# Patient Record
Sex: MALE | Race: WHITE | NOT HISPANIC OR LATINO | Employment: FULL TIME | URBAN - METROPOLITAN AREA
[De-identification: names, ages, dates, MRNs, and addresses within clinical notes are randomized per-mention and may not be internally consistent; named-entity substitution may affect disease eponyms.]

---

## 2017-02-16 DIAGNOSIS — Z00.00 ROUTINE GENERAL MEDICAL EXAMINATION AT A HEALTH CARE FACILITY: Primary | ICD-10-CM

## 2017-03-02 ENCOUNTER — CLINICAL SUPPORT (OUTPATIENT)
Dept: INTERNAL MEDICINE | Facility: CLINIC | Age: 52
End: 2017-03-02

## 2017-03-02 ENCOUNTER — HOSPITAL ENCOUNTER (OUTPATIENT)
Dept: CARDIOLOGY | Facility: CLINIC | Age: 52
Discharge: HOME | End: 2017-03-02
Payer: COMMERCIAL

## 2017-03-02 ENCOUNTER — HOSPITAL ENCOUNTER (OUTPATIENT)
Dept: RADIOLOGY | Facility: HOSPITAL | Age: 52
Discharge: HOME | End: 2017-03-02
Attending: INTERNAL MEDICINE
Payer: COMMERCIAL

## 2017-03-02 ENCOUNTER — OFFICE VISIT (OUTPATIENT)
Dept: PULMONOLOGY | Facility: CLINIC | Age: 52
End: 2017-03-02
Payer: COMMERCIAL

## 2017-03-02 ENCOUNTER — CLINICAL SUPPORT (OUTPATIENT)
Dept: INTERNAL MEDICINE | Facility: CLINIC | Age: 52
End: 2017-03-02
Payer: COMMERCIAL

## 2017-03-02 VITALS
WEIGHT: 243 LBS | BODY MASS INDEX: 34.79 KG/M2 | DIASTOLIC BLOOD PRESSURE: 98 MMHG | HEART RATE: 65 BPM | HEIGHT: 70 IN | SYSTOLIC BLOOD PRESSURE: 144 MMHG

## 2017-03-02 DIAGNOSIS — Z00.00 ANNUAL PHYSICAL EXAM: Primary | ICD-10-CM

## 2017-03-02 DIAGNOSIS — Z00.00 ROUTINE GENERAL MEDICAL EXAMINATION AT A HEALTH CARE FACILITY: Primary | ICD-10-CM

## 2017-03-02 DIAGNOSIS — Z00.00 ROUTINE GENERAL MEDICAL EXAMINATION AT A HEALTH CARE FACILITY: ICD-10-CM

## 2017-03-02 LAB
ALBUMIN SERPL BCP-MCNC: 4 G/DL
ALP SERPL-CCNC: 95 U/L
ALT SERPL W/O P-5'-P-CCNC: 53 U/L
ANION GAP SERPL CALC-SCNC: 7 MMOL/L
AST SERPL-CCNC: 29 U/L
BILIRUB SERPL-MCNC: 0.8 MG/DL
BUN SERPL-MCNC: 18 MG/DL
CALCIUM SERPL-MCNC: 9.7 MG/DL
CHLORIDE SERPL-SCNC: 106 MMOL/L
CHOLEST/HDLC SERPL: 5 {RATIO}
CO2 SERPL-SCNC: 27 MMOL/L
COMPLEXED PSA SERPL-MCNC: 0.38 NG/ML
CREAT SERPL-MCNC: 1.1 MG/DL
DIASTOLIC DYSFUNCTION: NO
ERYTHROCYTE [DISTWIDTH] IN BLOOD BY AUTOMATED COUNT: 13 %
EST. GFR  (AFRICAN AMERICAN): >60 ML/MIN/1.73 M^2
EST. GFR  (NON AFRICAN AMERICAN): >60 ML/MIN/1.73 M^2
ESTIMATED AVG GLUCOSE: 111 MG/DL
GLUCOSE SERPL-MCNC: 102 MG/DL
HBA1C MFR BLD HPLC: 5.5 %
HCT VFR BLD AUTO: 43.6 %
HCV AB SERPL QL IA: NEGATIVE
HDL/CHOLESTEROL RATIO: 20 %
HDLC SERPL-MCNC: 230 MG/DL
HDLC SERPL-MCNC: 46 MG/DL
HGB BLD-MCNC: 15.8 G/DL
HIV 1+2 AB+HIV1 P24 AG SERPL QL IA: NEGATIVE
LDLC SERPL CALC-MCNC: 164.2 MG/DL
MCH RBC QN AUTO: 31.3 PG
MCHC RBC AUTO-ENTMCNC: 36.2 %
MCV RBC AUTO: 87 FL
NONHDLC SERPL-MCNC: 184 MG/DL
PLATELET # BLD AUTO: 181 K/UL
PMV BLD AUTO: 10.1 FL
POTASSIUM SERPL-SCNC: 4 MMOL/L
PROT SERPL-MCNC: 7.6 G/DL
RBC # BLD AUTO: 5.04 M/UL
SODIUM SERPL-SCNC: 140 MMOL/L
TRIGL SERPL-MCNC: 99 MG/DL
TSH SERPL DL<=0.005 MIU/L-ACNC: 2.22 UIU/ML
WBC # BLD AUTO: 5.63 K/UL

## 2017-03-02 PROCEDURE — 83036 HEMOGLOBIN GLYCOSYLATED A1C: CPT

## 2017-03-02 PROCEDURE — 86803 HEPATITIS C AB TEST: CPT

## 2017-03-02 PROCEDURE — 80053 COMPREHEN METABOLIC PANEL: CPT

## 2017-03-02 PROCEDURE — 80061 LIPID PANEL: CPT

## 2017-03-02 PROCEDURE — 84153 ASSAY OF PSA TOTAL: CPT

## 2017-03-02 PROCEDURE — 93015 CV STRESS TEST SUPVJ I&R: CPT | Mod: S$GLB,,, | Performed by: INTERNAL MEDICINE

## 2017-03-02 PROCEDURE — 84443 ASSAY THYROID STIM HORMONE: CPT

## 2017-03-02 PROCEDURE — 97750 PHYSICAL PERFORMANCE TEST: CPT | Mod: S$GLB,,, | Performed by: INTERNAL MEDICINE

## 2017-03-02 PROCEDURE — 99999 PR PBB SHADOW E&M-EST. PATIENT-LVL III: CPT | Mod: PBBFAC,,, | Performed by: INTERNAL MEDICINE

## 2017-03-02 PROCEDURE — 71020 XR CHEST PA AND LATERAL: CPT | Mod: 26,,, | Performed by: RADIOLOGY

## 2017-03-02 PROCEDURE — 85027 COMPLETE CBC AUTOMATED: CPT

## 2017-03-02 PROCEDURE — 86703 HIV-1/HIV-2 1 RESULT ANTBDY: CPT

## 2017-03-02 PROCEDURE — 97802 MEDICAL NUTRITION INDIV IN: CPT | Mod: S$GLB,,, | Performed by: INTERNAL MEDICINE

## 2017-03-02 PROCEDURE — 99386 PREV VISIT NEW AGE 40-64: CPT | Mod: S$GLB,,, | Performed by: INTERNAL MEDICINE

## 2017-03-02 PROCEDURE — 71020 XR CHEST PA AND LATERAL: CPT | Mod: TC

## 2017-03-02 NOTE — PROGRESS NOTES
Subjective:       Patient ID: Michael Lilly is a 51 y.o. male.    Chief Complaint: No chief complaint on file.    HPI   Mr. Lilly has reported no previous history of cardiovascular or pulmonary disease. He has reported no physical limitations to exercise. Mr. Lilly currently stretches daily and runs/walks 5-6 days per week. He explained that he would like to lose about 40 pounds. Mr. Lilly moved to [place] about a year ago and says his biggest challenge has been the food.     Review of Systems    Objective:      The fitness evaluation results are as follows:  D.O.S. 3/2/2017   Height (in): 70   Weight (lbs): 243   BMI: 34.40563   Body Fat (%): 36.00   Waist (cm): 109   Hip (cm): 113   WHR: 0.96   RBP (mmHg): 138/92   RHR (bpm): 78    Strength R (lbs)t: 127    Strength Lt (lbs): 125.3333   Push-up Assessment: 35   Curl-up Assessment: 56   Flexibility Testing (cm): 38   REE (kcals): 2400     Physical Exam    Assessment:      Age/Gender Stratified Assessment:     Heart Rate: Normal   Resting BP: Normal   Body Fat %: Poor   WHR Risk Factor: Moderate Risk    Strength R: Average    Strength L: Above Average   Upper Body Endurance: Excellent   Abdominal Endurance: Above Average   Lower body Flexibility: Excellent       1. Routine general medical examination at a health care facility        Plan:    Mr. Lilly should continue to strive for 150 minutes of moderate intensity aerobic exercise each week in order to promote good heart health. He should also begin a resistance training program to maintain muscular strength and endurance. He is currently very active at home and is constantly doing house work on the weekends. Daily stretching should be continued to maintain current level of flexibility. Mr. Lilly should focus on maintaining his current level of fitness and decrease his body fat percentage over the next year.

## 2017-03-02 NOTE — LETTER
March 2, 2017    Michael Lilly  [address]  [place] [address]             [facility] - Pulmonary Services  [address]  [facility] [address]  Phone: [phone] Dear Mr. Lilly:    Thank you for allowing me to serve you and perform your Executive Health exam on 3/2/2017. This letter will serve as a brief summary of the physical findings and laboratory/studies performed and recommendations at this time. At today's assessment you had several lab value abnormalities, lipids and SGPT a  Liver enzyme. These will correct with weight loss. After losing 15-20 pounds over the next 2 1/2 months contact me so I can repeat these parameters.         If you have any questions or concerns, please don't hesitate to call.    Sincerely,        Ralph Landa MD

## 2017-03-02 NOTE — PROGRESS NOTES
Subjective:       Patient ID: Michael Lilly is a 51 y.o. male.    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 He feels well, lives here family still in [place]. Has been at his new job for one year today. 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 He has no medical complaints today or limitations. He walks  10,000 steps for exercise. He is overweight at 245 pounds, weighed 190 pounds in the [employer]. Takes no prescription drugs.   Review of Systems   Constitutional: Negative.    HENT: Negative.    Eyes: Negative.    Respiratory: Negative.    Cardiovascular: Negative.    Gastrointestinal: Negative.    Genitourinary: Negative.    Musculoskeletal: Negative.    Skin: Negative.    Neurological: Negative.    Psychiatric/Behavioral: Negative.    All other systems reviewed and are negative.      Objective:      Physical Exam   Constitutional: He is oriented to person, place, and time. He appears well-developed and well-nourished.   Obese: BMI:35   HENT:   Head: Normocephalic and atraumatic.   Right Ear: External ear normal.   Left Ear: External ear normal.   Eyes: Conjunctivae and EOM are normal. Pupils are equal, round, and reactive to light.   Neck: Normal range of motion. Neck supple.   Cardiovascular: Normal rate, regular rhythm and normal heart sounds.    /92 taken by me after resting.   Pulmonary/Chest: Effort normal and breath sounds normal.   Abdominal: Soft. Bowel sounds are normal.   Musculoskeletal: Normal range of motion.   Neurological: He is alert and oriented to person, place, and time. He has normal reflexes.   Skin: Skin is warm and dry.   Psychiatric: He has a normal mood and affect. His behavior is normal. Judgment and thought content normal.       Assessment:       No diagnosis found.    Plan:         Labs; Moderate elevation of cholesterol and LDL, has slight elevation  of SGPT, these are weight related. Have strongly advised him to commit to a diet and lose 15-20 pounds, Then re check lipids and SGPT. His chest x-ray is clear and his Stress EKG is not normal but is not diagnostic of ischemia. He denies chest pain or indigestion like symptoms. His way to better health and normalizing his lab abnormalities is to lose weight and increase his exercise by 20%.

## 2017-03-02 NOTE — PROGRESS NOTES
"Nutrition Assessment  Client name:  Michael Lilly  :  1965  Age:  51 y.o.  Gender:  male    Client states:  This is his first visit to  and also his first [place] and thought it was "very unique" and he will leave town next year. He is employed by [employer] travels by plane and car for business, and [facility]. He suffers from knee pain from his earlier years of football and states that he needs more regular exercise. About one month ago per the encouragement of his wife, he began taking a supplement called, Relief Factor and it has made a HUGE difference in decreasing his pain. He wears a Fit Bit and monitors his steps of walking 10,000 steps daily. By reducing his intake of carbs and breads and increasing vegetables and walking, he has been successful in losing 7# since [place]. In  he was in the [employer] and weighed 210#. He confidently states that he has the knowledge and the discipline but DOES NOT LIKE TO EXERCISE. When asked about his motivator to exercise and "what is holding him back"? He replied, " that is a good question" and had no answer. He shares that he is most challenged with healthy eating when traveling and admits to eating some type of fried food daily. His goal by next visit is to lose wt. By reducing the frequency of fried food.  He questions the validity of Relief Factor and asks for input.    No past medical history on file.    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  Vitamins, minerals, and/or supplements:  Relief Factor (anti - inflammatory)  Food allergies or intolerances:  none     Food History (recall when traveling 2x/month)  Breakfast:  2 boiled eggs, " "banana, handful dry roasted peanuts, water  Mid-morning Snack:  none  Lunch:  Burger no fries, banana + peanuts   Mid-afternoon Snack:  none  Dinner:  Fried chicken tenders, spinach  H.S. Snack:  none  Coke zero 3x/wk  100 oz. Water daily     Exercise History:  Walking 10,000 steps, walks 2 miles in evening 6x/wk    Lab Reports   Total Cholesterol:  230    Triglycerides:  99  HDL:  46  LDL:  164.2   Glucose:  102  HbA1c:  pending  BP:  138/92     Weight History  Height:  5'10"     Weight:  243  BMI:  34.94  % Body Fat:  36    Diagnosis  RMR (Method:  Body [place]):  2400 kcal  Activity Factor:  14  ERNA:  3360 - 250 = 3110    Altered nutrition related laboratory values related to improper food choices as evidenced by Chol: 230 and LDL: 164.2.    Intervention    Goals:  1.  Lose 28#, GOAL wt: 215  2.  Continue exercise 6/wk and water intake  3.  Reduce fried foods to 3x/wk  4.  3 servings fruit daily   5.  Improved lipids    Complimented client on recent behavior changes and wt. Loss. Pleasantly resistant to developing a specific strategy to accomplish the wt. Loss and later stated this was a helpful session and learned a great deal of information. Discussed and provided handout of the heart healthy fats, differences of saturated and trans fats and sources and alternate choices. Explained the 3 categories of meat with emphasis of why lean is highly suggested.suggested. Reviewed the Fast Food, Lite Restaurant dining guides and explained the Eat Fit EDMUNDO program and to use as resources while traveling and dining in the city for lunch or dinner. Client enjoys valderrama and recommended center cut. Reviewed Relief Factor and made of pure, natural ingredients (contains fish oil) and no indication of harm.    Handouts provided:  Meal Planning Guide  Restaurant Guide  Eat Fit Shopping List  Eat Fit Edmundo  Fast Food Guide  Vitamin/Mineral Guide    Monitoring/Evaluation    Monitor the following:  Weight  BMI  % Body Fat  Caloric " intake  Labs:  Cholesterol/LDL    Follow Up Plan:  Follow up with client in 1-2 years

## 2017-10-02 ENCOUNTER — OFFICE VISIT (OUTPATIENT)
Dept: URGENT CARE | Facility: CLINIC | Age: 52
End: 2017-10-02
Payer: COMMERCIAL

## 2017-10-02 VITALS
HEART RATE: 75 BPM | TEMPERATURE: 98 F | SYSTOLIC BLOOD PRESSURE: 132 MMHG | HEIGHT: 70 IN | BODY MASS INDEX: 32.93 KG/M2 | OXYGEN SATURATION: 97 % | DIASTOLIC BLOOD PRESSURE: 83 MMHG | WEIGHT: 230 LBS | RESPIRATION RATE: 18 BRPM

## 2017-10-02 DIAGNOSIS — K57.92 ACUTE DIVERTICULITIS: Primary | ICD-10-CM

## 2017-10-02 PROCEDURE — 99213 OFFICE O/P EST LOW 20 MIN: CPT | Mod: 25,S$GLB,, | Performed by: INTERNAL MEDICINE

## 2017-10-02 PROCEDURE — 3008F BODY MASS INDEX DOCD: CPT | Mod: S$GLB,,, | Performed by: INTERNAL MEDICINE

## 2017-10-02 PROCEDURE — 96372 THER/PROPH/DIAG INJ SC/IM: CPT | Mod: S$GLB,,, | Performed by: INTERNAL MEDICINE

## 2017-10-02 RX ORDER — METRONIDAZOLE 500 MG/1
500 TABLET ORAL 3 TIMES DAILY
Qty: 30 TABLET | Refills: 0 | Status: SHIPPED | OUTPATIENT
Start: 2017-10-02 | End: 2017-10-12

## 2017-10-02 RX ORDER — CIPROFLOXACIN 500 MG/1
500 TABLET ORAL 2 TIMES DAILY
Qty: 20 TABLET | Refills: 0 | Status: SHIPPED | OUTPATIENT
Start: 2017-10-02 | End: 2017-10-12

## 2017-10-02 RX ORDER — CEFTRIAXONE 1 G/1
1 INJECTION, POWDER, FOR SOLUTION INTRAMUSCULAR; INTRAVENOUS
Status: COMPLETED | OUTPATIENT
Start: 2017-10-02 | End: 2017-10-02

## 2017-10-02 RX ADMIN — CEFTRIAXONE 1 G: 1 INJECTION, POWDER, FOR SOLUTION INTRAMUSCULAR; INTRAVENOUS at 04:10

## 2017-10-02 NOTE — PROGRESS NOTES
"Subjective:       Patient ID: Michael Lilly is a 52 y.o. male.    Vitals:  height is 5' 10" (1.778 m) and weight is 104.3 kg (230 lb). His temperature is 98.4 °F (36.9 °C). His blood pressure is 132/83 and his pulse is 75. His respiration is 18 and oxygen saturation is 97%.     Chief Complaint: Abdominal Pain    Abdominal Pain   This is a new problem. The current episode started in the past 7 days. The onset quality is gradual. The problem occurs intermittently. The problem has been gradually worsening. The pain is located in the suprapubic region and periumbilical region. The pain is at a severity of 7/10. The pain is moderate. The quality of the pain is sharp. Associated symptoms include diarrhea. Pertinent negatives include no constipation, dysuria, fever, hematochezia, melena, nausea or vomiting. The pain is aggravated by movement. The pain is relieved by being still. He has tried nothing for the symptoms. The treatment provided no relief.     Review of Systems   Constitution: Positive for malaise/fatigue. Negative for chills and fever.   Cardiovascular: Negative for chest pain.   Respiratory: Negative for shortness of breath.    Musculoskeletal: Negative for back pain.   Gastrointestinal: Positive for abdominal pain and diarrhea. Negative for constipation, hematochezia, melena, nausea and vomiting.   Genitourinary: Negative for dysuria.       Objective:      Physical Exam   Constitutional: He is oriented to person, place, and time. He appears well-developed and well-nourished.   HENT:   Head: Normocephalic and atraumatic.   Eyes: Conjunctivae and EOM are normal. Pupils are equal, round, and reactive to light.   Neck: Normal range of motion. Neck supple.   Cardiovascular: Normal rate and regular rhythm.    Pulmonary/Chest: Effort normal and breath sounds normal.   Abdominal: Bowel sounds are normal.   LLQ tenderness with mild guarding; no rebound; no masses or organomegaly   Neurological: He is alert and oriented to " person, place, and time.       Assessment:       1. Acute diverticulitis        Plan:         Acute diverticulitis  -     ciprofloxacin HCl (CIPRO) 500 MG tablet; Take 1 tablet (500 mg total) by mouth 2 (two) times daily.  Dispense: 20 tablet; Refill: 0  -     metronidazole (FLAGYL) 500 MG tablet; Take 1 tablet (500 mg total) by mouth 3 (three) times daily.  Dispense: 30 tablet; Refill: 0  -     cefTRIAXone injection 1 g; Inject 1 g into the muscle one time.

## 2018-06-18 DIAGNOSIS — Z00.00 ROUTINE GENERAL MEDICAL EXAMINATION AT A HEALTH CARE FACILITY: Primary | ICD-10-CM

## 2018-07-12 ENCOUNTER — CLINICAL SUPPORT (OUTPATIENT)
Dept: INTERNAL MEDICINE | Facility: CLINIC | Age: 53
End: 2018-07-12

## 2018-07-12 ENCOUNTER — OFFICE VISIT (OUTPATIENT)
Dept: PULMONOLOGY | Facility: CLINIC | Age: 53
End: 2018-07-12
Payer: COMMERCIAL

## 2018-07-12 ENCOUNTER — CLINICAL SUPPORT (OUTPATIENT)
Dept: INTERNAL MEDICINE | Facility: CLINIC | Age: 53
End: 2018-07-12
Payer: COMMERCIAL

## 2018-07-12 ENCOUNTER — HOSPITAL ENCOUNTER (OUTPATIENT)
Dept: CARDIOLOGY | Facility: CLINIC | Age: 53
Discharge: HOME | End: 2018-07-12
Payer: COMMERCIAL

## 2018-07-12 VITALS
WEIGHT: 236 LBS | HEIGHT: 70 IN | BODY MASS INDEX: 33.79 KG/M2 | DIASTOLIC BLOOD PRESSURE: 73 MMHG | HEART RATE: 52 BPM | SYSTOLIC BLOOD PRESSURE: 125 MMHG

## 2018-07-12 DIAGNOSIS — Z00.00 ROUTINE GENERAL MEDICAL EXAMINATION AT A HEALTH CARE FACILITY: Primary | ICD-10-CM

## 2018-07-12 DIAGNOSIS — Z00.00 ANNUAL PHYSICAL EXAM: Primary | ICD-10-CM

## 2018-07-12 DIAGNOSIS — Z00.00 ROUTINE GENERAL MEDICAL EXAMINATION AT A HEALTH CARE FACILITY: ICD-10-CM

## 2018-07-12 LAB
ALBUMIN SERPL BCP-MCNC: 4 G/DL
ALP SERPL-CCNC: 98 U/L
ALT SERPL W/O P-5'-P-CCNC: 35 U/L
ANION GAP SERPL CALC-SCNC: 8 MMOL/L
AST SERPL-CCNC: 24 U/L
BILIRUB SERPL-MCNC: 1 MG/DL
BUN SERPL-MCNC: 18 MG/DL
CALCIUM SERPL-MCNC: 9.7 MG/DL
CHLORIDE SERPL-SCNC: 105 MMOL/L
CHOLEST SERPL-MCNC: 170 MG/DL
CHOLEST/HDLC SERPL: 4.9 {RATIO}
CO2 SERPL-SCNC: 26 MMOL/L
COMPLEXED PSA SERPL-MCNC: 0.44 NG/ML
CREAT SERPL-MCNC: 1.3 MG/DL
ERYTHROCYTE [DISTWIDTH] IN BLOOD BY AUTOMATED COUNT: 13.5 %
EST. GFR  (AFRICAN AMERICAN): >60 ML/MIN/1.73 M^2
EST. GFR  (NON AFRICAN AMERICAN): >60 ML/MIN/1.73 M^2
ESTIMATED AVG GLUCOSE: 103 MG/DL
GLUCOSE SERPL-MCNC: 94 MG/DL
HBA1C MFR BLD HPLC: 5.2 %
HCT VFR BLD AUTO: 50.7 %
HDLC SERPL-MCNC: 35 MG/DL
HDLC SERPL: 20.6 %
HGB BLD-MCNC: 17.1 G/DL
LDLC SERPL CALC-MCNC: 123 MG/DL
MCH RBC QN AUTO: 30.6 PG
MCHC RBC AUTO-ENTMCNC: 33.7 G/DL
MCV RBC AUTO: 91 FL
NONHDLC SERPL-MCNC: 135 MG/DL
PLATELET # BLD AUTO: 203 K/UL
PMV BLD AUTO: 10.6 FL
POTASSIUM SERPL-SCNC: 4.6 MMOL/L
PROT SERPL-MCNC: 7.4 G/DL
RBC # BLD AUTO: 5.58 M/UL
SODIUM SERPL-SCNC: 139 MMOL/L
TRIGL SERPL-MCNC: 60 MG/DL
TSH SERPL DL<=0.005 MIU/L-ACNC: 1.87 UIU/ML
WBC # BLD AUTO: 5.21 K/UL

## 2018-07-12 PROCEDURE — 80053 COMPREHEN METABOLIC PANEL: CPT

## 2018-07-12 PROCEDURE — 97802 MEDICAL NUTRITION INDIV IN: CPT | Mod: S$GLB,,, | Performed by: INTERNAL MEDICINE

## 2018-07-12 PROCEDURE — 84443 ASSAY THYROID STIM HORMONE: CPT

## 2018-07-12 PROCEDURE — 80061 LIPID PANEL: CPT

## 2018-07-12 PROCEDURE — 99999 PR PBB SHADOW E&M-EST. PATIENT-LVL III: CPT | Mod: PBBFAC,,, | Performed by: INTERNAL MEDICINE

## 2018-07-12 PROCEDURE — 84153 ASSAY OF PSA TOTAL: CPT

## 2018-07-12 PROCEDURE — 85027 COMPLETE CBC AUTOMATED: CPT

## 2018-07-12 PROCEDURE — 99396 PREV VISIT EST AGE 40-64: CPT | Mod: S$GLB,,, | Performed by: INTERNAL MEDICINE

## 2018-07-12 PROCEDURE — 93000 ELECTROCARDIOGRAM COMPLETE: CPT | Mod: S$GLB,,, | Performed by: INTERNAL MEDICINE

## 2018-07-12 PROCEDURE — 83036 HEMOGLOBIN GLYCOSYLATED A1C: CPT

## 2018-07-12 PROCEDURE — 97750 PHYSICAL PERFORMANCE TEST: CPT | Mod: S$GLB,,, | Performed by: INTERNAL MEDICINE

## 2018-07-12 NOTE — LETTER
July 13, 2018    Michael Lilly  [address]  [place] [address]             [facility] - Pulmonary Services  [address]  [facility] [address]  Phone: [phone] Dear Mr. Lilly:    Thank you for allowing me to serve you and perform your Executive Health exam on 7/12/2018. This letter will serve as a brief summary of the physical findings and laboratory/studies performed and recommendations at this time. Today's assessment is essentially normal. Keep up the weight loss program and good luck with further growth with the company.        If you have any questions or concerns, please don't hesitate to call.    Sincerely,        Ralph Landa MD

## 2018-07-12 NOTE — PROGRESS NOTES
Subjective:       Patient ID: Michael Lilly is a 53 y.o. male.    Chief Complaint: No chief complaint on file.    HPI   Pt. Has no significant cardiovascular or pulmonary history.    Physical Limitations: None    Current exercise routine:  Pt. Runs 2-2.5 miles and walks 1.5-2 miles 5 days a week.  He incorporates upper body and core resistance exercises into his walking routine 5 days a week.  Pt. Does not have any formal lower body resistance training or flexibility routine at the current time.    Goals:  Pt. Has a goal weight of 230 lbs.  We discussed the importance of paying more attention to body fat % versus his weight since he is highly muscular.    Fun Facts:  Pt. Lives in [place], [place] for most of the time now.  He is happy to be out of [place] and feels that he lives a healthier lifestyle now.    Review of Systems    Objective:     The fitness evaluation results are as follows:  D.O.S. 7/12/2018 3/2/2017   Height (in): 70 70   Weight (lbs): 237 243   BMI: 34.46635 34.142084   Body Fat (%): 23.53 36.00   Waist (cm): 108 109   Hip (cm): 112 113   WHR: 0.96 0.96   RBP (mmHg): 116/86 138/92   RHR (bpm): 50 78    Strength R (lbs)t: 136.6667 127    Strength Lt (lbs): 125 125.58443   Push-up Assessment: 46 35   Curl-up Assessment: 41 56   Flexibility Testing (cm): 39 38   REE (kcals): 2120 2400       Physical Exam    Assessment:     Age/gender stratified assessment:  Resting BP: Within Normal Limits   Body Fat %: good   WHR Risk Factor: moderate risk    Strength R: above average    Strength L: above average   Upper Body Endurance: excellent   Abdominal Endurance: average   Lower body Flexibiltiy: excellent       1. Routine general medical examination at a health care facility        Plan:       Recommended fitness guidelines:    -150 minutes of moderate intensity aerobic exercise per week or 75 minutes of vigorous intensity aerobic exercise per week.  Try to incorporate more vigorous intensity  aerobic training into your routine.    -2 to 4 days per week of resistance training for each muscle group.  Start to incorporate more lower body resisatnce training exercises.  Practice the core stabilization program given during the evaluation.    -Daily stretching with a hold of at least 30 seconds per muscle group.

## 2018-07-12 NOTE — PROGRESS NOTES
"Nutrition Assessment  This is a general nutrition consultation as per the contractual agreement of the client employer's insurance carrier.    Client name:  Michael Lilly  :  1965  Age:  53 y.o.  Gender:  male    Client states:  In a joking manner that I can paste his note from last year as he it is much the same free of many changes in eating.  He feels the best that he has in a long time. He attributes this to use of testosterone pellets injected twice per year. Additionally he struggled with the motivation to exercise, however that improved as well. He continues to use "Relief Factor" and has added, Callotren (Collagen) and overall has less aches and pains. His work with [employer] in [place] requires car travel and he has not been in [place] except for weekends and this change is responsible for him losing 5# as he does not have the constant temptation of fried foods. He [place] in [place], [place] and as a result, his wife and son were bitten by a specific species of tick and have become allergic to red meat. His protein choices due to this incident, are more chicken and fish usually tuna. Overall he is eating less carb's, fried foods, less red meat and is exercising at a higher intensity---all improvements from last visit which are positively reflected in his lipid results. He has no specific nutritional related questions at this time.     No past medical history on file.    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  Vitamins, minerals, and/or supplements:  Callotren (Collagen), Relief Factor (joint pain)   Food allergies or intolerances:  none     Food History  Breakfast:  2- 3 boiled eggs  Mid-morning Snack:  " "none  Lunch:  Chicken sandwich  Mid-afternoon Snack:  None   Dinner:  Grilled tuna with vegetables  H.S. Snack:  None  Beverages: 5.5 bottles water, unsweetened tea, diet coke 1-2 daily     Exercise History:  Runs 2.5 miles + walking 2 miles 5xwk for duration of 1.25 hrs.    Lab Reports   Total Cholesterol:  170    Triglycerides:  60  HDL:  35  LDL:  123   Glucose:  94  HbA1c:  5.2  BP:  116/86     Weight History  Height:  5'10"     Weight:  237  BMI:  34.08  % Body Fat:  23.53    Diagnosis  RMR (Method:  Body [place]):  2120 kcal  Activity Factor:  1.4  ENRA:  2968    No nutrition related diagnosis at this time as client verbalizes he declines new goals for the next year.    Intervention    Goals:  1.  Continue with healthy eating choices  2.  Continue with exercise routine    Reviewed labs with client and all lipids improved with the exception of HDL. Complimented client on these positive results, motivating himself to exercise and implementing good choices and behaviors while traveling and no prescribed medications. Client declines new goals at this time and wishes to continue what he is currently doing. He appears to be surprised by his improved lipid results, as it was explained that all of his improved choices are a contribution to these readings.    Handouts provided:  Meal Planning Guide  Restaurant Guide  Eat Fit Shopping List  Eat Fit Arti  Fast Food Guide  Vitamin/Mineral Guide    Monitoring/Evaluation    Monitor the following:  Weight  BMI  % Body Fat  Caloric intake  Labs:  CMP/Lipids    Follow Up Plan:  Follow up with client in 1-2 years  "

## 2018-07-13 NOTE — PROGRESS NOTES
Subjective:       Patient ID: Michael Lilly is a 53 y.o. male.    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 He feels well, had one bout of acute diverticulitis since last visit that responded to cipro and flagyl. Feels fine today. Takes no prescription drugs. His wife and family still live in [place] and goes home of the week end. No medical complaints today. Has lost 7 pounds and is working on losing more.  Review of Systems   Constitutional: Negative.    HENT: Negative.    Eyes: Negative.    Respiratory: Negative.    Cardiovascular: Negative.    Gastrointestinal: Negative.         One bout of acute diverticulitis   Genitourinary: Negative.    Musculoskeletal: Negative.    Skin: Negative.    Neurological: Negative.    Psychiatric/Behavioral: Negative.    All other systems reviewed and are negative.      Objective:      Physical Exam   Constitutional: He is oriented to person, place, and time. He appears well-developed and well-nourished.   HENT:   Head: Normocephalic and atraumatic.   Right Ear: External ear normal.   Left Ear: External ear normal.   Eyes: Conjunctivae and EOM are normal. Pupils are equal, round, and reactive to light.   Neck: Normal range of motion. Neck supple.   Cardiovascular: Normal rate, regular rhythm and normal heart sounds.    Pulmonary/Chest: Effort normal and breath sounds normal.   Abdominal: Soft. Bowel sounds are normal.   Negative. No LLL findings.   Musculoskeletal: Normal range of motion.   Neurological: He is alert and oriented to person, place, and time. He has normal reflexes.   Skin: Skin is warm and dry.   Psychiatric: He has a normal mood and affect. His behavior is normal. Judgment and thought content normal.       Assessment:       No diagnosis found.    Plan:       Labs:All parameters are normal. Lipids  Have improved dramatically. EKG is normal.  IMP: Healthy Male

## 2019-06-07 DIAGNOSIS — Z00.00 ROUTINE GENERAL MEDICAL EXAMINATION AT A HEALTH CARE FACILITY: Primary | ICD-10-CM

## 2019-07-11 ENCOUNTER — CLINICAL SUPPORT (OUTPATIENT)
Dept: INTERNAL MEDICINE | Facility: CLINIC | Age: 54
End: 2019-07-11
Payer: COMMERCIAL

## 2019-07-11 ENCOUNTER — HOSPITAL ENCOUNTER (OUTPATIENT)
Dept: CARDIOLOGY | Facility: CLINIC | Age: 54
Discharge: HOME | End: 2019-07-11
Payer: COMMERCIAL

## 2019-07-11 ENCOUNTER — OFFICE VISIT (OUTPATIENT)
Dept: PULMONOLOGY | Facility: CLINIC | Age: 54
End: 2019-07-11
Payer: COMMERCIAL

## 2019-07-11 VITALS
HEIGHT: 70 IN | DIASTOLIC BLOOD PRESSURE: 82 MMHG | HEART RATE: 65 BPM | BODY MASS INDEX: 34.93 KG/M2 | SYSTOLIC BLOOD PRESSURE: 133 MMHG | WEIGHT: 244 LBS | RESPIRATION RATE: 12 BRPM

## 2019-07-11 DIAGNOSIS — Z00.00 ROUTINE GENERAL MEDICAL EXAMINATION AT A HEALTH CARE FACILITY: ICD-10-CM

## 2019-07-11 DIAGNOSIS — Z00.00 ROUTINE GENERAL MEDICAL EXAMINATION AT A HEALTH CARE FACILITY: Primary | ICD-10-CM

## 2019-07-11 DIAGNOSIS — Z00.00 ANNUAL PHYSICAL EXAM: Primary | ICD-10-CM

## 2019-07-11 LAB
ALBUMIN SERPL BCP-MCNC: 3.9 G/DL (ref 3.5–5.2)
ALP SERPL-CCNC: 80 U/L (ref 55–135)
ALT SERPL W/O P-5'-P-CCNC: 38 U/L (ref 10–44)
ANION GAP SERPL CALC-SCNC: 8 MMOL/L (ref 8–16)
AST SERPL-CCNC: 25 U/L (ref 10–40)
BILIRUB SERPL-MCNC: 1 MG/DL (ref 0.1–1)
BUN SERPL-MCNC: 11 MG/DL (ref 6–20)
CALCIUM SERPL-MCNC: 9.5 MG/DL (ref 8.7–10.5)
CHLORIDE SERPL-SCNC: 105 MMOL/L (ref 95–110)
CHOLEST SERPL-MCNC: 207 MG/DL (ref 120–199)
CHOLEST/HDLC SERPL: 5.8 {RATIO} (ref 2–5)
CO2 SERPL-SCNC: 26 MMOL/L (ref 23–29)
COMPLEXED PSA SERPL-MCNC: 0.48 NG/ML (ref 0–4)
CREAT SERPL-MCNC: 1.3 MG/DL (ref 0.5–1.4)
ERYTHROCYTE [DISTWIDTH] IN BLOOD BY AUTOMATED COUNT: 13.2 % (ref 11.5–14.5)
EST. GFR  (AFRICAN AMERICAN): >60 ML/MIN/1.73 M^2
EST. GFR  (NON AFRICAN AMERICAN): >60 ML/MIN/1.73 M^2
ESTIMATED AVG GLUCOSE: 108 MG/DL (ref 68–131)
GLUCOSE SERPL-MCNC: 90 MG/DL (ref 70–110)
HBA1C MFR BLD HPLC: 5.4 % (ref 4–5.6)
HCT VFR BLD AUTO: 51.9 % (ref 40–54)
HDLC SERPL-MCNC: 36 MG/DL (ref 40–75)
HDLC SERPL: 17.4 % (ref 20–50)
HGB BLD-MCNC: 17.6 G/DL (ref 14–18)
LDLC SERPL CALC-MCNC: 152.4 MG/DL (ref 63–159)
MCH RBC QN AUTO: 31.4 PG (ref 27–31)
MCHC RBC AUTO-ENTMCNC: 33.9 G/DL (ref 32–36)
MCV RBC AUTO: 93 FL (ref 82–98)
NONHDLC SERPL-MCNC: 171 MG/DL
PLATELET # BLD AUTO: 194 K/UL (ref 150–350)
PMV BLD AUTO: 10.5 FL (ref 9.2–12.9)
POTASSIUM SERPL-SCNC: 4.8 MMOL/L (ref 3.5–5.1)
PROT SERPL-MCNC: 7.4 G/DL (ref 6–8.4)
RBC # BLD AUTO: 5.61 M/UL (ref 4.6–6.2)
SODIUM SERPL-SCNC: 139 MMOL/L (ref 136–145)
TRIGL SERPL-MCNC: 93 MG/DL (ref 30–150)
TSH SERPL DL<=0.005 MIU/L-ACNC: 2.98 UIU/ML (ref 0.4–4)
WBC # BLD AUTO: 6.18 K/UL (ref 3.9–12.7)

## 2019-07-11 PROCEDURE — 97802 MEDICAL NUTRITION INDIV IN: CPT | Mod: S$GLB,,, | Performed by: INTERNAL MEDICINE

## 2019-07-11 PROCEDURE — 97750 PR PHYSICAL PERFORMANCE TEST: ICD-10-PCS | Mod: S$GLB,,, | Performed by: INTERNAL MEDICINE

## 2019-07-11 PROCEDURE — 84153 ASSAY OF PSA TOTAL: CPT

## 2019-07-11 PROCEDURE — 83036 HEMOGLOBIN GLYCOSYLATED A1C: CPT

## 2019-07-11 PROCEDURE — 93000 EKG 12-LEAD: ICD-10-PCS | Mod: S$GLB,,, | Performed by: INTERNAL MEDICINE

## 2019-07-11 PROCEDURE — 97750 PHYSICAL PERFORMANCE TEST: CPT | Mod: S$GLB,,, | Performed by: INTERNAL MEDICINE

## 2019-07-11 PROCEDURE — 99396 PR PREVENTIVE VISIT,EST,40-64: ICD-10-PCS | Mod: S$PBB,,, | Performed by: INTERNAL MEDICINE

## 2019-07-11 PROCEDURE — 85027 COMPLETE CBC AUTOMATED: CPT

## 2019-07-11 PROCEDURE — 99999 PR PBB SHADOW E&M-EST. PATIENT-LVL III: CPT | Mod: PBBFAC,,, | Performed by: INTERNAL MEDICINE

## 2019-07-11 PROCEDURE — 93000 ELECTROCARDIOGRAM COMPLETE: CPT | Mod: S$GLB,,, | Performed by: INTERNAL MEDICINE

## 2019-07-11 PROCEDURE — 84443 ASSAY THYROID STIM HORMONE: CPT

## 2019-07-11 PROCEDURE — 99999 PR PBB SHADOW E&M-EST. PATIENT-LVL III: ICD-10-PCS | Mod: PBBFAC,,, | Performed by: INTERNAL MEDICINE

## 2019-07-11 PROCEDURE — 97802 PR MED NUTR THER, 1ST, INDIV, EA 15 MIN: ICD-10-PCS | Mod: S$GLB,,, | Performed by: INTERNAL MEDICINE

## 2019-07-11 PROCEDURE — 80053 COMPREHEN METABOLIC PANEL: CPT

## 2019-07-11 PROCEDURE — 80061 LIPID PANEL: CPT

## 2019-07-11 PROCEDURE — 99396 PREV VISIT EST AGE 40-64: CPT | Mod: S$PBB,,, | Performed by: INTERNAL MEDICINE

## 2019-07-11 NOTE — PROGRESS NOTES
"Nutrition Assessment  Client name:  Michael Lilly    (Annual  physical)  :  1965  Age:  54 y.o.  Gender:  male    Client states: he continues to use "Relief Factor" and has added, Callotren (Collagen) and overall has less aches and pains. His work with [employer] in [employer] requires 70% car travel and he dines out all meals and has the constant temptation of fried foods, and frankly he says, "I was in the [employer] but have not been exercising discipline. I feel like I exercise, therefore I should be able to eat what I want". Based on this approach, he has gained 9# since last visit and adds that he is experiencing more stress with his work, and appears disappointed in himself. He resides in [place], [place]" and as a result, his wife and son were bitten by a specific species of tick and have become allergic to red meat. His protein choices due to this incident, are more chicken and fish usually tuna. In spite of road travel, he is consistent with his exercise routine and chooses to jog 4.5 miles in the morning outdoors. Client is not very engaged and only responds to questions, as he knows what to do, have more discipline. By next visit he would like to lose wt. And have improved Cholesterol and LDL levels.     Anthropometrics  Height:  5'10"     Weight:  246  BMI:  35.37  % Body Fat:  24.03    Clinical Signs/Symptoms  N/V/D:  none  Appetite (Good, Fair, or Poor):  good      No past medical history on file.    No past surgical history on file.    Medications    currently has no medications in their medication list.    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    Cultural/Spiritual/Personal Preferences:  None identified    Support System:  friends    State of Change:  Preparation    Barriers to Change:  Stress, car travel, lack of disciple     Diagnosis    Altered nutrition related laboratory values related to improper food choices and imbalanced meals as evidenced by Chol: 207, LDL: 152.4 and HDL: 36.    Intervention    RMR (Method:  Body [place]):  2330 kcal  Activity Factor:  1.4  ERNA:  3262 - 125 = 3137    Goals:  1.  Lose 9#, Goal Wt: 237#  2.  50% of meals - healthy choices, using dining guides  3.  Chol: <207, LDL: <152  4.  Continue with aerobic exercise    Nutrition Education  Reviewed and explained laboratory results and sources of saturated and trans fats and plant based fats to lower lipids and increase HDL. Mentioned to client that stress can drive lipids upward, and when the stress has lessened it may be easier for him to focus on self care and healthier selections. Reviewed the Fast Food, Lite Restaurant Dining guide and Eat fit mobile viviane with client and he responded that he will use the Lite Restaurant guide. Consult was 30 minutes in length as client did not have any questions.     Patient verbalized understanding of nutrition education and recommendations received.    Handouts Provided  Meal Planning Guide  Restaurant Guide  Eat Fit Shopping List  Eat Fit Arti  Fast Food Guide  Vitamin/Mineral Guide    Monitoring/Evaluation    Monitor the following:  Weight  BMI  % Body Fat  Caloric intake  Labs:  Lipids    Follow Up Plan:  Communication with referring healthcare provider is unnecessary at this time  as patient presented as part of annual wellness exam.  However, will follow up with patient in 1-2 years.

## 2019-07-11 NOTE — PROGRESS NOTES
Subjective:       Patient ID: Michael Lilly is a 54 y.o. male.    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 He feels well, no medical encounters since his last visit, He has a small easily reducible umbilical hernia. He lives  In [place] and commutes to [place] for work. Takes no medications but did gain back the 7 pounds that he lost. He weighs 244 but very muscular.    Review of Systems   Constitutional: Negative.    HENT: Negative.    Eyes: Negative.    Respiratory: Negative.    Cardiovascular: Negative.    Gastrointestinal: Negative.         Hx of infrequent bouts of diverticulitis  No episodes in the past year.   Genitourinary: Negative.    Musculoskeletal: Negative.    Skin: Negative.    Neurological: Negative.    Psychiatric/Behavioral: Negative.    All other systems reviewed and are negative.      Objective:      Physical Exam   Constitutional: He is oriented to person, place, and time. He appears well-developed and well-nourished.   HENT:   Head: Normocephalic and atraumatic.   Right Ear: External ear normal.   Left Ear: External ear normal.   Eyes: Pupils are equal, round, and reactive to light. Conjunctivae and EOM are normal.   Neck: Normal range of motion. Neck supple.   Cardiovascular: Normal rate, regular rhythm and normal heart sounds.   /80   Pulmonary/Chest: Effort normal and breath sounds normal.   Peak flow 600 l/min   Abdominal: Soft. Bowel sounds are normal.   Small easy reducible umbilical hernia   Musculoskeletal: Normal range of motion.   Neurological: He is alert and oriented to person, place, and time. He has normal reflexes.   Skin: Skin is warm and dry.   Psychiatric: He has a normal mood and affect. His behavior is normal. Judgment and thought content normal.       Assessment:       1. Annual physical exam        Plan:       Labs:Choolesterol: 207 up from 170 last year, all other parameters are normal.  EKG :Nomral. Imp; Mild type II hyperlipidemia

## 2019-07-11 NOTE — PROGRESS NOTES
Subjective:       Patient ID: Michael Lilly is a 54 y.o. male.    Chief Complaint: No chief complaint on file.    HPI   Pt. Has no significant cardiovascular or pulmonary history.    Physical Limitations:  None.      Current exercise routine:  Patient currently performs full-body resistance training exercise and runs/walks 3-6 miles, 4 days a week.  Patient stretches an average of 2 days a week.    Goals:  Patient set a goal weight of 230 lbs.    Fun Facts:  Patient was very friendly and engaged.  Patient has been traveling a lot over the last year between [facility], and [place].  Patient is very aware that he has gained weight over the past year and attributes this to his diet.  Patient was very receptive to all recommendations made.      Review of Systems    Objective:     The fitness evaluation results are as follows:  D.O.S. 7/11/2019 7/12/2018 3/2/2017   Height (in): 70 70 70   Weight (lbs): 246 237 243   BMI: 35.158956 34.877269 34.293148   Body Fat (%): 24.03 23.53 36.00   Waist (cm): 110 108 109   Hip (cm): 113 112 113   WHR: 0.97 0.96 0.96   RBP (mmHg): 128/86 116/86 138/92   RHR (bpm): 66 50 78    Strength R (lbs)t: 133.44439 136.14258 127    Strength Lt (lbs): 130 125 125.74633   Push-up Assessment: 50 46 35   Curl-up Assessment: 40 41 56   Flexibility Testing (cm): 36 39 38   REE (kcals): 2330 2120 2400       Physical Exam    Assessment:     Age/gender stratified assessment:  Resting BP: Within Normal Limits   Body Fat %: Good   WHR Risk Factor: Moderate Risk    Strength R: Above Average    Strength L: Above Average   Upper Body Endurance: Excellent   Abdominal Endurance: Average   Lower body Flexibiltiy: Excellent       1. Routine general medical examination at a health care facility        Plan:       Recommended fitness guidelines:    -150 minutes of moderate intensity aerobic exercise per week or 75 minutes of vigorous intensity aerobic exercise per week.  Try  to incorporate some interval training into your current routine to increase your heart rate and get more vigorous intensity exercise, for short periods of time.    -2 to 4 days per week of resistance training for each muscle group.      -Daily stretching with a hold of at least 30 seconds per muscle group.

## 2019-07-11 NOTE — LETTER
July 11, 2019    Michael Lilly  [address]  [place] [address]             [facility]  [address]  [facility] [address]  Phone: [phone] Dear Mr. Lilly:    Thank you for allowing me to serve you and perform your Executive Health exam on 7/11/2019. This letter will serve as a brief summary of the physical findings and laboratory/studies performed and recommendations at this time.  Except for a slight elevation of the total cholesterol, this is a normal exam. You can address the cholesterol issue with a diet modification.         If you have any questions or concerns, please don't hesitate to call.    Sincerely,        Ralph Landa MD

## 2020-06-02 DIAGNOSIS — Z00.00 ROUTINE GENERAL MEDICAL EXAMINATION AT A HEALTH CARE FACILITY: Primary | ICD-10-CM

## 2020-07-15 ENCOUNTER — CLINICAL SUPPORT (OUTPATIENT)
Dept: INTERNAL MEDICINE | Facility: CLINIC | Age: 55
End: 2020-07-15
Payer: COMMERCIAL

## 2020-07-15 ENCOUNTER — OFFICE VISIT (OUTPATIENT)
Dept: PULMONOLOGY | Facility: CLINIC | Age: 55
End: 2020-07-15
Payer: COMMERCIAL

## 2020-07-15 ENCOUNTER — CLINICAL SUPPORT (OUTPATIENT)
Dept: INTERNAL MEDICINE | Facility: CLINIC | Age: 55
End: 2020-07-15

## 2020-07-15 ENCOUNTER — HOSPITAL ENCOUNTER (OUTPATIENT)
Dept: CARDIOLOGY | Facility: CLINIC | Age: 55
Discharge: HOME | End: 2020-07-15
Payer: COMMERCIAL

## 2020-07-15 VITALS
SYSTOLIC BLOOD PRESSURE: 123 MMHG | DIASTOLIC BLOOD PRESSURE: 81 MMHG | WEIGHT: 236 LBS | HEIGHT: 70 IN | BODY MASS INDEX: 33.79 KG/M2 | HEART RATE: 65 BPM

## 2020-07-15 DIAGNOSIS — Z00.00 ROUTINE GENERAL MEDICAL EXAMINATION AT A HEALTH CARE FACILITY: Primary | ICD-10-CM

## 2020-07-15 DIAGNOSIS — Z00.00 ANNUAL PHYSICAL EXAM: Primary | ICD-10-CM

## 2020-07-15 DIAGNOSIS — Z00.00 ROUTINE GENERAL MEDICAL EXAMINATION AT A HEALTH CARE FACILITY: ICD-10-CM

## 2020-07-15 LAB
ALBUMIN SERPL BCP-MCNC: 4 G/DL (ref 3.5–5.2)
ALP SERPL-CCNC: 75 U/L (ref 55–135)
ALT SERPL W/O P-5'-P-CCNC: 37 U/L (ref 10–44)
ANION GAP SERPL CALC-SCNC: 6 MMOL/L (ref 8–16)
AST SERPL-CCNC: 29 U/L (ref 10–40)
BILIRUB SERPL-MCNC: 0.9 MG/DL (ref 0.1–1)
BUN SERPL-MCNC: 15 MG/DL (ref 6–20)
CALCIUM SERPL-MCNC: 9.6 MG/DL (ref 8.7–10.5)
CHLORIDE SERPL-SCNC: 106 MMOL/L (ref 95–110)
CHOLEST SERPL-MCNC: 173 MG/DL (ref 120–199)
CHOLEST/HDLC SERPL: 4.9 {RATIO} (ref 2–5)
CO2 SERPL-SCNC: 27 MMOL/L (ref 23–29)
COMPLEXED PSA SERPL-MCNC: 0.35 NG/ML (ref 0–4)
CREAT SERPL-MCNC: 1.3 MG/DL (ref 0.5–1.4)
ERYTHROCYTE [DISTWIDTH] IN BLOOD BY AUTOMATED COUNT: 13.6 % (ref 11.5–14.5)
EST. GFR  (AFRICAN AMERICAN): >60 ML/MIN/1.73 M^2
EST. GFR  (NON AFRICAN AMERICAN): >60 ML/MIN/1.73 M^2
ESTIMATED AVG GLUCOSE: 105 MG/DL (ref 68–131)
GLUCOSE SERPL-MCNC: 88 MG/DL (ref 70–110)
HBA1C MFR BLD HPLC: 5.3 % (ref 4–5.6)
HCT VFR BLD AUTO: 51.7 % (ref 40–54)
HDLC SERPL-MCNC: 35 MG/DL (ref 40–75)
HDLC SERPL: 20.2 % (ref 20–50)
HGB BLD-MCNC: 17.4 G/DL (ref 14–18)
LDLC SERPL CALC-MCNC: 126.6 MG/DL (ref 63–159)
MCH RBC QN AUTO: 30.4 PG (ref 27–31)
MCHC RBC AUTO-ENTMCNC: 33.7 G/DL (ref 32–36)
MCV RBC AUTO: 90 FL (ref 82–98)
NONHDLC SERPL-MCNC: 138 MG/DL
PLATELET # BLD AUTO: 218 K/UL (ref 150–350)
PMV BLD AUTO: 10.5 FL (ref 9.2–12.9)
POTASSIUM SERPL-SCNC: 4.5 MMOL/L (ref 3.5–5.1)
PROT SERPL-MCNC: 7.4 G/DL (ref 6–8.4)
RBC # BLD AUTO: 5.72 M/UL (ref 4.6–6.2)
SODIUM SERPL-SCNC: 139 MMOL/L (ref 136–145)
TRIGL SERPL-MCNC: 57 MG/DL (ref 30–150)
TSH SERPL DL<=0.005 MIU/L-ACNC: 1.99 UIU/ML (ref 0.4–4)
WBC # BLD AUTO: 5.38 K/UL (ref 3.9–12.7)

## 2020-07-15 PROCEDURE — 80061 LIPID PANEL: CPT

## 2020-07-15 PROCEDURE — 93005 EKG 12-LEAD: ICD-10-PCS | Mod: S$GLB,,, | Performed by: INTERNAL MEDICINE

## 2020-07-15 PROCEDURE — 3008F PR BODY MASS INDEX (BMI) DOCUMENTED: ICD-10-PCS | Mod: CPTII,S$GLB,, | Performed by: INTERNAL MEDICINE

## 2020-07-15 PROCEDURE — 99999 PR PBB SHADOW E&M-EST. PATIENT-LVL III: CPT | Mod: PBBFAC,,, | Performed by: INTERNAL MEDICINE

## 2020-07-15 PROCEDURE — 97750 PHYSICAL PERFORMANCE TEST: CPT | Mod: S$GLB,,, | Performed by: INTERNAL MEDICINE

## 2020-07-15 PROCEDURE — 99396 PREV VISIT EST AGE 40-64: CPT | Mod: S$GLB,,, | Performed by: INTERNAL MEDICINE

## 2020-07-15 PROCEDURE — 85027 COMPLETE CBC AUTOMATED: CPT

## 2020-07-15 PROCEDURE — 84153 ASSAY OF PSA TOTAL: CPT

## 2020-07-15 PROCEDURE — 99396 PR PREVENTIVE VISIT,EST,40-64: ICD-10-PCS | Mod: S$GLB,,, | Performed by: INTERNAL MEDICINE

## 2020-07-15 PROCEDURE — 83036 HEMOGLOBIN GLYCOSYLATED A1C: CPT

## 2020-07-15 PROCEDURE — 93010 ELECTROCARDIOGRAM REPORT: CPT | Mod: S$GLB,,, | Performed by: INTERNAL MEDICINE

## 2020-07-15 PROCEDURE — 84443 ASSAY THYROID STIM HORMONE: CPT

## 2020-07-15 PROCEDURE — 97802 MEDICAL NUTRITION INDIV IN: CPT | Mod: S$GLB,,, | Performed by: INTERNAL MEDICINE

## 2020-07-15 PROCEDURE — 3008F BODY MASS INDEX DOCD: CPT | Mod: CPTII,S$GLB,, | Performed by: INTERNAL MEDICINE

## 2020-07-15 PROCEDURE — 80053 COMPREHEN METABOLIC PANEL: CPT

## 2020-07-15 PROCEDURE — 97802 PR MED NUTR THER, 1ST, INDIV, EA 15 MIN: ICD-10-PCS | Mod: S$GLB,,, | Performed by: INTERNAL MEDICINE

## 2020-07-15 PROCEDURE — 93010 EKG 12-LEAD: ICD-10-PCS | Mod: S$GLB,,, | Performed by: INTERNAL MEDICINE

## 2020-07-15 PROCEDURE — 93005 ELECTROCARDIOGRAM TRACING: CPT | Mod: S$GLB,,, | Performed by: INTERNAL MEDICINE

## 2020-07-15 PROCEDURE — 97750 PR PHYSICAL PERFORMANCE TEST: ICD-10-PCS | Mod: S$GLB,,, | Performed by: INTERNAL MEDICINE

## 2020-07-15 PROCEDURE — 99999 PR PBB SHADOW E&M-EST. PATIENT-LVL III: ICD-10-PCS | Mod: PBBFAC,,, | Performed by: INTERNAL MEDICINE

## 2020-07-15 NOTE — PROGRESS NOTES
Subjective:       Patient ID: Michael Lilly is a 55 y.o. male.    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 He feels well, he has a home in [place] so he doesn't have to commute back and forth to [place] every week end. No medical complaints. He has a small easily reducible umbilical hernia, all else is normal. Takes no medications on a regular basis and exercises regularly .   Review of Systems   Constitutional: Negative.    HENT: Negative.    Eyes: Negative.    Respiratory: Negative.    Cardiovascular: Negative.    Gastrointestinal: Negative.         Small umbilical hernia   Genitourinary: Negative.    Musculoskeletal: Negative.    Integumentary:  Negative.   Neurological: Negative.    Psychiatric/Behavioral: Negative.    All other systems reviewed and are negative.        Objective:      Physical Exam  Constitutional:       Appearance: He is well-developed.   HENT:      Head: Normocephalic and atraumatic.      Right Ear: External ear normal.      Left Ear: External ear normal.   Eyes:      Conjunctiva/sclera: Conjunctivae normal.      Pupils: Pupils are equal, round, and reactive to light.   Neck:      Musculoskeletal: Normal range of motion and neck supple.   Cardiovascular:      Rate and Rhythm: Normal rate and regular rhythm.      Heart sounds: Normal heart sounds.   Pulmonary:      Effort: Pulmonary effort is normal.      Breath sounds: Normal breath sounds.   Abdominal:      General: Bowel sounds are normal.      Palpations: Abdomen is soft.      Comments: Small easily reduced umbilical hernia   Musculoskeletal: Normal range of motion.   Skin:     General: Skin is warm and dry.   Neurological:      Mental Status: He is alert and oriented to person, place, and time.      Deep Tendon Reflexes: Reflexes are normal and symmetric.   Psychiatric:         Behavior: Behavior normal.         Thought Content: Thought content normal.         Judgment:  Judgment normal.         Assessment:       1. Annual physical exam        Plan:       Labs; Cholesterol: 207 with normal LDL, all other parameters are normal. EKG is normal. IMP: Mild type II hyperlipidemia

## 2020-07-15 NOTE — LETTER
July 15, 2020    Michael Lilly  [address]  [place] [address]             [facility]  [address]  [facility] [address]  Phone: [phone] Dear  Michael,       Thank you for allowing me to serve you and perform your Executive Health exam on 7/15/2020. This letter will serve as a brief summary of the physical findings and laboratory/studies performed and recommendations at this time. Except for a slight elevation of your cholesterol that can be managed with diet, this is a normal exam. I would have a dermatologist look at your scalp to exclude and early small skin cancers .         If you have any questions or concerns, please don't hesitate to call.    Sincerely,        Ralph Landa MD

## 2020-07-15 NOTE — LETTER
July 15, 2020    Michael Lilly  [address] [place] [address]             [facility]  [address]  [facility] [address]  Phone: [phone] Dear Michael,      Thank you for allowing me to serve you and perform your Executive Health exam on 7/15/2020. This letter will serve as a brief summary of the physical findings and laboratory/studies performed and recommendations at this time. Except for a slight elevation of the cholesterol, that can be managed with diet, this is a normal exam. It would not be a bad idea to have a dermatologist look at your scalp for early small skin cancers.          If you have any questions or concerns, please don't hesitate to call.    Sincerely,        Ralph Landa MD

## 2020-07-15 NOTE — PROGRESS NOTES
"Nutrition Assessment  Client name:  Michael Lilly    (Annual  physical)  :  1965  Age:  55 y.o.  Gender:  male    Client states:  He is pleased with his fitness assessment today and has made improvements over last year. Jogs in the morning at 6 am. In an effort to lose weight, he with conscious effort, reduced carbohydrate intake in the past 3-4 months, eats no bread or potatoes, pasta, fruit or yogurt, rice or  yogurt. Primarily he consumes lean meat, vegetables and nuts. He resides in [place], [place]" and as a result, his wife and son were bitten by a specific species of tick and have become allergic to red meat. His protein choices due to this incident, are more chicken and fish usually tuna. Limiting red meat to 2x/month when he dines out with clients. 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 His goal is to lose an additional 15 -20#.     Anthropometrics  Height:  5'10"     Weight:  236.1#  BMI:  33.94  % Body Fat:  23.20    Clinical Signs/Symptoms  N/V/D:  none  Appetite (Good, Fair, or Poor):  good      No past medical history on file.    No past surgical history on file.    Medications    currently has no medications in their medication list.    Vitamins, Minerals, and/or Supplements:  Relief Factor for joints     Food/Medication Interactions:  Reviewed     Food Allergies or Intolerances:  none     Social History    Marital status:  Unknown  Employment:  [employer] - [employer]    Social History     Tobacco Use    Smoking status: Never Smoker    Smokeless tobacco: Never Used   Substance Use Topics    Alcohol use: Yes     Comment: social only - 3-4 beers week        Lab Reports   Total Cholesterol:  173    Triglycerides:  57  HDL:  35  LDL:  126.6   Glucose:  88  HbA1c:  5.3  BP:  110/90     Food History  Breakfast:  2 eggs, sausage or valderrama, water  Mid-morning Snack:  nuts  Lunch:  "  salad with ranch dressing, water  Mid-afternoon Snack:  nuts  Dinner:  1.5 - 2 chicken breasts, guacamole, water  H.S. Snack:  none  *Fluid intake:  3 - 4 liters Water daily, ETOH    Exercise History:  Resistance training 3-4x/wk for 45 minutes to 1 hr. + jogging 1 hr. 2x/wk    Cultural/Spiritual/Personal Preferences:  Prefers low carb foods when losing wt.     Support System:  friends    State of Change:  Action    Barriers to Change:  None identified    Diagnosis    Overweight related to previous improper food choices and excessive caloric intake as evidenced by BMI: 33.94.    Intervention    RMR (Method: In Body):  2146 kcal  Activity Factor:  1.4 (per REE)  ERNA:  3004 - 500 = 2504    Goals:  1.  Continue with aerobic exercise  2.  Goal Wt: 216 - 221#   3.  Continue with healthy lipids  4.  Client chooses to continue with current eating plan     Nutrition Education  Reviewed and explained laboratory values and complimented client on achievement of 2 goals from last visit of wt. Loss, 1% decrease in body fat and cholesterol decreases. Additionally Triglyceride and LDL improvements all within healthy range. HDL consistently is low in spite of client consuming guacamole with regularity. He plans to continue present strategy and lose an additional 15 - 20#.     Patient verbalized understanding of nutrition education and recommendations received.    Handouts Provided  Meal Planning Guide  Restaurant Guide  Eat Fit Shopping List  Eat Fit Arti  Fast Food Guide  Vitamin/Mineral Guide    Monitoring/Evaluation    Monitor the following:  Weight  BMI  % Body Fat  Caloric intake  Labs:  Lipids/HAIC    Follow Up Plan:  Communication with referring healthcare provider is unnecessary at this time as patient presented as part of annual wellness exam.  However, will follow up with patient in 1-2 years.

## 2020-07-15 NOTE — PROGRESS NOTES
"Subjective:       Patient ID: Michael Lilly is a 55 y.o. male.    Chief Complaint: No chief complaint on file.    HPI   Pt. Has no significant cardiovascular or pulmonary history.    Physical Limitations:  None.      Current exercise routine:  Patient currently performs full-body resistance training exercises, 3-4 days a week.  Patient runs for 45-60 minutes, 1-2 days a week.  Patient does not practice any formal flexibility routine at the current time.    Goals:  Patient set a goal weight of 220 lbs.    Fun Facts:  Patient was engaged but not compliant about wearing his mask.  Patient stated "when it is my time to go, it is my time" and was clearly annoyed with wearing his mask.  Patient asked to go out in the [place] to get some cool air multiple times.  Patient seems motivated to lose weight and was receptive to all recommendations made.      Review of Systems      Objective:     The fitness evaluation results are as follows:  D.O.S. 7/15/2020 7/11/2019 7/12/2018 3/2/2017   Height (in): 70 70 70 70   Weight (lbs): 236.1 246 237 243   BMI: 33.881076 35.319228 34.909996 34.84642   Body Fat (%): 23.20 24.03 23.53 36.00   Waist (cm): 109 110 108 109   Hip (cm): 111 113 112 113   WHR: 0.98 0.97 0.96 0.96   RBP (mmHg): 110/90 128/86 116/86 138/92   RHR (bpm): 64 66 50 78    Strength R (lbs)t: 140 133.47688 136.78963 127    Strength Lt (lbs): 138.55054 130 873 744.4335   Push-up Assessment: 61 50 46 35   Curl-up Assessment: 45 40 41 56   Flexibility Testing (cm): 37 36 39 38   REE (kcals): 2146 2330 2120 2400       Physical Exam    Assessment:     Age/gender stratified assessment:  Resting BP: Elevated   Body Fat %: Very Good   WHR Risk Factor: Moderate Risk    Strength R: Above Average    Strength L: Above Average   Upper Body Endurance: Excellent   Abdominal Endurance: Above Average   Lower body Flexibiltiy: Excellent       1. Routine general medical examination at a health care facility        Plan:     "   Recommended fitness guidelines:    -150 minutes of moderate intensity aerobic exercise per week or 75 minutes of vigorous intensity aerobic exercise per week.    -2 to 4 days per week of resistance training for each muscle group.      -Daily stretching with a hold of at least 30 seconds per muscle group.

## 2021-06-22 ENCOUNTER — OFFICE VISIT (OUTPATIENT)
Dept: URGENT CARE | Facility: CLINIC | Age: 56
End: 2021-06-22
Payer: COMMERCIAL

## 2021-06-22 VITALS
RESPIRATION RATE: 15 BRPM | WEIGHT: 245 LBS | HEART RATE: 75 BPM | DIASTOLIC BLOOD PRESSURE: 97 MMHG | HEIGHT: 70 IN | BODY MASS INDEX: 35.07 KG/M2 | TEMPERATURE: 98 F | SYSTOLIC BLOOD PRESSURE: 148 MMHG | OXYGEN SATURATION: 98 %

## 2021-06-22 DIAGNOSIS — R10.30 LOWER ABDOMINAL PAIN: ICD-10-CM

## 2021-06-22 DIAGNOSIS — K57.92 DIVERTICULITIS: Primary | ICD-10-CM

## 2021-06-22 LAB
BILIRUB UR QL STRIP: NEGATIVE
GLUCOSE UR QL STRIP: NEGATIVE
KETONES UR QL STRIP: NEGATIVE
LEUKOCYTE ESTERASE UR QL STRIP: NEGATIVE
PH, POC UA: 6 (ref 5–8)
POC BLOOD, URINE: NEGATIVE
POC NITRATES, URINE: NEGATIVE
PROT UR QL STRIP: NEGATIVE
SP GR UR STRIP: 1 (ref 1–1.03)
UROBILINOGEN UR STRIP-ACNC: NORMAL (ref 0.3–2.2)

## 2021-06-22 PROCEDURE — 81003 URINALYSIS AUTO W/O SCOPE: CPT | Mod: QW,S$GLB,, | Performed by: SURGERY

## 2021-06-22 PROCEDURE — 81003 POCT URINALYSIS, DIPSTICK, AUTOMATED, W/O SCOPE: ICD-10-PCS | Mod: QW,S$GLB,, | Performed by: SURGERY

## 2021-06-22 PROCEDURE — 99214 PR OFFICE/OUTPT VISIT, EST, LEVL IV, 30-39 MIN: ICD-10-PCS | Mod: 25,S$GLB,, | Performed by: SURGERY

## 2021-06-22 PROCEDURE — 3008F PR BODY MASS INDEX (BMI) DOCUMENTED: ICD-10-PCS | Mod: CPTII,S$GLB,, | Performed by: SURGERY

## 2021-06-22 PROCEDURE — 99214 OFFICE O/P EST MOD 30 MIN: CPT | Mod: 25,S$GLB,, | Performed by: SURGERY

## 2021-06-22 PROCEDURE — 3008F BODY MASS INDEX DOCD: CPT | Mod: CPTII,S$GLB,, | Performed by: SURGERY

## 2021-06-22 RX ORDER — AMOXICILLIN AND CLAVULANATE POTASSIUM 875; 125 MG/1; MG/1
1 TABLET, FILM COATED ORAL 2 TIMES DAILY
Qty: 14 TABLET | Refills: 0 | Status: SHIPPED | OUTPATIENT
Start: 2021-06-22 | End: 2021-06-29

## 2021-08-17 ENCOUNTER — OFFICE VISIT (OUTPATIENT)
Dept: URGENT CARE | Facility: CLINIC | Age: 56
End: 2021-08-17
Payer: COMMERCIAL

## 2021-08-17 VITALS
BODY MASS INDEX: 35.07 KG/M2 | DIASTOLIC BLOOD PRESSURE: 88 MMHG | OXYGEN SATURATION: 97 % | HEART RATE: 87 BPM | TEMPERATURE: 101 F | HEIGHT: 70 IN | WEIGHT: 245 LBS | SYSTOLIC BLOOD PRESSURE: 143 MMHG

## 2021-08-17 DIAGNOSIS — K57.92 DIVERTICULITIS: Primary | ICD-10-CM

## 2021-08-17 LAB
CTP QC/QA: YES
SARS-COV-2 RDRP RESP QL NAA+PROBE: NEGATIVE

## 2021-08-17 PROCEDURE — 1125F PR PAIN SEVERITY QUANTIFIED, PAIN PRESENT: ICD-10-PCS | Mod: CPTII,S$GLB,, | Performed by: FAMILY MEDICINE

## 2021-08-17 PROCEDURE — 3077F SYST BP >= 140 MM HG: CPT | Mod: CPTII,S$GLB,, | Performed by: FAMILY MEDICINE

## 2021-08-17 PROCEDURE — 99214 OFFICE O/P EST MOD 30 MIN: CPT | Mod: S$GLB,CS,, | Performed by: FAMILY MEDICINE

## 2021-08-17 PROCEDURE — 1125F AMNT PAIN NOTED PAIN PRSNT: CPT | Mod: CPTII,S$GLB,, | Performed by: FAMILY MEDICINE

## 2021-08-17 PROCEDURE — 1159F MED LIST DOCD IN RCRD: CPT | Mod: CPTII,S$GLB,, | Performed by: FAMILY MEDICINE

## 2021-08-17 PROCEDURE — 3077F PR MOST RECENT SYSTOLIC BLOOD PRESSURE >= 140 MM HG: ICD-10-PCS | Mod: CPTII,S$GLB,, | Performed by: FAMILY MEDICINE

## 2021-08-17 PROCEDURE — 3008F BODY MASS INDEX DOCD: CPT | Mod: CPTII,S$GLB,, | Performed by: FAMILY MEDICINE

## 2021-08-17 PROCEDURE — 3008F PR BODY MASS INDEX (BMI) DOCUMENTED: ICD-10-PCS | Mod: CPTII,S$GLB,, | Performed by: FAMILY MEDICINE

## 2021-08-17 PROCEDURE — 1159F PR MEDICATION LIST DOCUMENTED IN MEDICAL RECORD: ICD-10-PCS | Mod: CPTII,S$GLB,, | Performed by: FAMILY MEDICINE

## 2021-08-17 PROCEDURE — 3079F DIAST BP 80-89 MM HG: CPT | Mod: CPTII,S$GLB,, | Performed by: FAMILY MEDICINE

## 2021-08-17 PROCEDURE — U0002: ICD-10-PCS | Mod: QW,S$GLB,, | Performed by: FAMILY MEDICINE

## 2021-08-17 PROCEDURE — 1160F PR REVIEW ALL MEDS BY PRESCRIBER/CLIN PHARMACIST DOCUMENTED: ICD-10-PCS | Mod: CPTII,S$GLB,, | Performed by: FAMILY MEDICINE

## 2021-08-17 PROCEDURE — 99214 PR OFFICE/OUTPT VISIT, EST, LEVL IV, 30-39 MIN: ICD-10-PCS | Mod: S$GLB,CS,, | Performed by: FAMILY MEDICINE

## 2021-08-17 PROCEDURE — 3079F PR MOST RECENT DIASTOLIC BLOOD PRESSURE 80-89 MM HG: ICD-10-PCS | Mod: CPTII,S$GLB,, | Performed by: FAMILY MEDICINE

## 2021-08-17 PROCEDURE — 1160F RVW MEDS BY RX/DR IN RCRD: CPT | Mod: CPTII,S$GLB,, | Performed by: FAMILY MEDICINE

## 2021-08-17 PROCEDURE — U0002 COVID-19 LAB TEST NON-CDC: HCPCS | Mod: QW,S$GLB,, | Performed by: FAMILY MEDICINE

## 2021-08-17 RX ORDER — CIPROFLOXACIN 500 MG/1
500 TABLET ORAL 2 TIMES DAILY
Qty: 14 TABLET | Refills: 0 | Status: SHIPPED | OUTPATIENT
Start: 2021-08-17 | End: 2021-08-24

## 2021-08-17 RX ORDER — METRONIDAZOLE 500 MG/1
500 TABLET ORAL EVERY 12 HOURS
Qty: 14 TABLET | Refills: 0 | Status: SHIPPED | OUTPATIENT
Start: 2021-08-17 | End: 2021-09-30

## 2021-08-20 DIAGNOSIS — Z00.00 ROUTINE GENERAL MEDICAL EXAMINATION AT A HEALTH CARE FACILITY: Primary | ICD-10-CM

## 2021-09-30 ENCOUNTER — NUTRITION (OUTPATIENT)
Dept: INTERNAL MEDICINE | Facility: CLINIC | Age: 56
End: 2021-09-30

## 2021-09-30 ENCOUNTER — CLINICAL SUPPORT (OUTPATIENT)
Dept: INTERNAL MEDICINE | Facility: CLINIC | Age: 56
End: 2021-09-30
Payer: COMMERCIAL

## 2021-09-30 ENCOUNTER — CLINICAL SUPPORT (OUTPATIENT)
Dept: INTERNAL MEDICINE | Facility: CLINIC | Age: 56
End: 2021-09-30

## 2021-09-30 ENCOUNTER — OFFICE VISIT (OUTPATIENT)
Dept: PULMONOLOGY | Facility: CLINIC | Age: 56
End: 2021-09-30
Payer: COMMERCIAL

## 2021-09-30 ENCOUNTER — HOSPITAL ENCOUNTER (OUTPATIENT)
Dept: CARDIOLOGY | Facility: CLINIC | Age: 56
Discharge: HOME | End: 2021-09-30
Payer: COMMERCIAL

## 2021-09-30 VITALS
HEIGHT: 70 IN | SYSTOLIC BLOOD PRESSURE: 149 MMHG | BODY MASS INDEX: 34.36 KG/M2 | HEART RATE: 81 BPM | WEIGHT: 240 LBS | DIASTOLIC BLOOD PRESSURE: 74 MMHG

## 2021-09-30 DIAGNOSIS — Z00.00 ROUTINE GENERAL MEDICAL EXAMINATION AT A HEALTH CARE FACILITY: ICD-10-CM

## 2021-09-30 DIAGNOSIS — Z00.00 ROUTINE GENERAL MEDICAL EXAMINATION AT A HEALTH CARE FACILITY: Primary | ICD-10-CM

## 2021-09-30 DIAGNOSIS — Z00.00 ANNUAL PHYSICAL EXAM: Primary | ICD-10-CM

## 2021-09-30 LAB
ALBUMIN SERPL BCP-MCNC: 4.3 G/DL (ref 3.5–5.2)
ALP SERPL-CCNC: 78 U/L (ref 55–135)
ALT SERPL W/O P-5'-P-CCNC: 52 U/L (ref 10–44)
ANION GAP SERPL CALC-SCNC: 12 MMOL/L (ref 8–16)
AST SERPL-CCNC: 34 U/L (ref 10–40)
BILIRUB SERPL-MCNC: 0.9 MG/DL (ref 0.1–1)
BUN SERPL-MCNC: 17 MG/DL (ref 6–20)
CALCIUM SERPL-MCNC: 10.3 MG/DL (ref 8.7–10.5)
CHLORIDE SERPL-SCNC: 105 MMOL/L (ref 95–110)
CHOLEST SERPL-MCNC: 204 MG/DL (ref 120–199)
CHOLEST/HDLC SERPL: 5.7 {RATIO} (ref 2–5)
CO2 SERPL-SCNC: 23 MMOL/L (ref 23–29)
COMPLEXED PSA SERPL-MCNC: 0.4 NG/ML (ref 0–4)
CREAT SERPL-MCNC: 1.4 MG/DL (ref 0.5–1.4)
ERYTHROCYTE [DISTWIDTH] IN BLOOD BY AUTOMATED COUNT: 14.3 % (ref 11.5–14.5)
EST. GFR  (AFRICAN AMERICAN): >60 ML/MIN/1.73 M^2
EST. GFR  (NON AFRICAN AMERICAN): 55.4 ML/MIN/1.73 M^2
ESTIMATED AVG GLUCOSE: 111 MG/DL (ref 68–131)
FERRITIN SERPL-MCNC: 106 NG/ML (ref 20–300)
GLUCOSE SERPL-MCNC: 101 MG/DL (ref 70–110)
HBA1C MFR BLD: 5.5 % (ref 4–5.6)
HCT VFR BLD AUTO: 53.2 % (ref 40–54)
HDLC SERPL-MCNC: 36 MG/DL (ref 40–75)
HDLC SERPL: 17.6 % (ref 20–50)
HGB BLD-MCNC: 18.6 G/DL (ref 14–18)
LDLC SERPL CALC-MCNC: 152.8 MG/DL (ref 63–159)
MCH RBC QN AUTO: 30.7 PG (ref 27–31)
MCHC RBC AUTO-ENTMCNC: 35 G/DL (ref 32–36)
MCV RBC AUTO: 88 FL (ref 82–98)
NONHDLC SERPL-MCNC: 168 MG/DL
PLATELET # BLD AUTO: 223 K/UL (ref 150–450)
PMV BLD AUTO: 10.1 FL (ref 9.2–12.9)
POTASSIUM SERPL-SCNC: 4.1 MMOL/L (ref 3.5–5.1)
PROT SERPL-MCNC: 7.8 G/DL (ref 6–8.4)
RBC # BLD AUTO: 6.05 M/UL (ref 4.6–6.2)
SODIUM SERPL-SCNC: 140 MMOL/L (ref 136–145)
TRIGL SERPL-MCNC: 76 MG/DL (ref 30–150)
TSH SERPL DL<=0.005 MIU/L-ACNC: 2.35 UIU/ML (ref 0.4–4)
WBC # BLD AUTO: 6.18 K/UL (ref 3.9–12.7)

## 2021-09-30 PROCEDURE — 3044F HG A1C LEVEL LT 7.0%: CPT | Mod: CPTII,S$GLB,, | Performed by: INTERNAL MEDICINE

## 2021-09-30 PROCEDURE — 1159F PR MEDICATION LIST DOCUMENTED IN MEDICAL RECORD: ICD-10-PCS | Mod: CPTII,S$GLB,, | Performed by: INTERNAL MEDICINE

## 2021-09-30 PROCEDURE — 99999 PR PBB SHADOW E&M-EST. PATIENT-LVL II: ICD-10-PCS | Mod: PBBFAC,,, | Performed by: INTERNAL MEDICINE

## 2021-09-30 PROCEDURE — 3078F PR MOST RECENT DIASTOLIC BLOOD PRESSURE < 80 MM HG: ICD-10-PCS | Mod: CPTII,S$GLB,, | Performed by: INTERNAL MEDICINE

## 2021-09-30 PROCEDURE — 3078F DIAST BP <80 MM HG: CPT | Mod: CPTII,S$GLB,, | Performed by: INTERNAL MEDICINE

## 2021-09-30 PROCEDURE — 93005 ELECTROCARDIOGRAM TRACING: CPT | Mod: S$GLB,,, | Performed by: INTERNAL MEDICINE

## 2021-09-30 PROCEDURE — 82728 ASSAY OF FERRITIN: CPT | Performed by: INTERNAL MEDICINE

## 2021-09-30 PROCEDURE — 99999 PR PBB SHADOW E&M-EST. PATIENT-LVL II: CPT | Mod: PBBFAC,,, | Performed by: INTERNAL MEDICINE

## 2021-09-30 PROCEDURE — 3008F BODY MASS INDEX DOCD: CPT | Mod: CPTII,S$GLB,, | Performed by: INTERNAL MEDICINE

## 2021-09-30 PROCEDURE — 99396 PR PREVENTIVE VISIT,EST,40-64: ICD-10-PCS | Mod: S$GLB,,, | Performed by: INTERNAL MEDICINE

## 2021-09-30 PROCEDURE — 3077F PR MOST RECENT SYSTOLIC BLOOD PRESSURE >= 140 MM HG: ICD-10-PCS | Mod: CPTII,S$GLB,, | Performed by: INTERNAL MEDICINE

## 2021-09-30 PROCEDURE — 97750 PR PHYSICAL PERFORMANCE TEST: ICD-10-PCS | Mod: S$GLB,,, | Performed by: INTERNAL MEDICINE

## 2021-09-30 PROCEDURE — 3008F PR BODY MASS INDEX (BMI) DOCUMENTED: ICD-10-PCS | Mod: CPTII,S$GLB,, | Performed by: INTERNAL MEDICINE

## 2021-09-30 PROCEDURE — 93010 EKG 12-LEAD: ICD-10-PCS | Mod: S$GLB,,, | Performed by: INTERNAL MEDICINE

## 2021-09-30 PROCEDURE — 97802 MEDICAL NUTRITION INDIV IN: CPT | Mod: S$GLB,,, | Performed by: INTERNAL MEDICINE

## 2021-09-30 PROCEDURE — 83036 HEMOGLOBIN GLYCOSYLATED A1C: CPT | Performed by: INTERNAL MEDICINE

## 2021-09-30 PROCEDURE — 84443 ASSAY THYROID STIM HORMONE: CPT | Performed by: INTERNAL MEDICINE

## 2021-09-30 PROCEDURE — 93005 EKG 12-LEAD: ICD-10-PCS | Mod: S$GLB,,, | Performed by: INTERNAL MEDICINE

## 2021-09-30 PROCEDURE — 85027 COMPLETE CBC AUTOMATED: CPT | Performed by: INTERNAL MEDICINE

## 2021-09-30 PROCEDURE — 99396 PREV VISIT EST AGE 40-64: CPT | Mod: S$GLB,,, | Performed by: INTERNAL MEDICINE

## 2021-09-30 PROCEDURE — 84153 ASSAY OF PSA TOTAL: CPT | Performed by: INTERNAL MEDICINE

## 2021-09-30 PROCEDURE — 97802 PR MED NUTR THER, 1ST, INDIV, EA 15 MIN: ICD-10-PCS | Mod: S$GLB,,, | Performed by: INTERNAL MEDICINE

## 2021-09-30 PROCEDURE — 3077F SYST BP >= 140 MM HG: CPT | Mod: CPTII,S$GLB,, | Performed by: INTERNAL MEDICINE

## 2021-09-30 PROCEDURE — 3044F PR MOST RECENT HEMOGLOBIN A1C LEVEL <7.0%: ICD-10-PCS | Mod: CPTII,S$GLB,, | Performed by: INTERNAL MEDICINE

## 2021-09-30 PROCEDURE — 97750 PHYSICAL PERFORMANCE TEST: CPT | Mod: S$GLB,,, | Performed by: INTERNAL MEDICINE

## 2021-09-30 PROCEDURE — 1159F MED LIST DOCD IN RCRD: CPT | Mod: CPTII,S$GLB,, | Performed by: INTERNAL MEDICINE

## 2021-09-30 PROCEDURE — 80053 COMPREHEN METABOLIC PANEL: CPT | Performed by: INTERNAL MEDICINE

## 2021-09-30 PROCEDURE — 80061 LIPID PANEL: CPT | Performed by: INTERNAL MEDICINE

## 2021-09-30 PROCEDURE — 93010 ELECTROCARDIOGRAM REPORT: CPT | Mod: S$GLB,,, | Performed by: INTERNAL MEDICINE

## 2023-04-04 DIAGNOSIS — Z00.00 ROUTINE GENERAL MEDICAL EXAMINATION AT A HEALTH CARE FACILITY: Primary | ICD-10-CM

## 2023-04-25 ENCOUNTER — OFFICE VISIT (OUTPATIENT)
Dept: PULMONOLOGY | Facility: CLINIC | Age: 58
End: 2023-04-25
Payer: COMMERCIAL

## 2023-04-25 ENCOUNTER — HOSPITAL ENCOUNTER (OUTPATIENT)
Dept: RADIOLOGY | Facility: HOSPITAL | Age: 58
Discharge: HOME | End: 2023-04-25
Attending: INTERNAL MEDICINE
Payer: COMMERCIAL

## 2023-04-25 ENCOUNTER — CLINICAL SUPPORT (OUTPATIENT)
Dept: INTERNAL MEDICINE | Facility: CLINIC | Age: 58
End: 2023-04-25

## 2023-04-25 ENCOUNTER — CLINICAL SUPPORT (OUTPATIENT)
Dept: INTERNAL MEDICINE | Facility: CLINIC | Age: 58
End: 2023-04-25
Payer: COMMERCIAL

## 2023-04-25 ENCOUNTER — HOSPITAL ENCOUNTER (OUTPATIENT)
Dept: CARDIOLOGY | Facility: HOSPITAL | Age: 58
Discharge: HOME | End: 2023-04-25
Attending: INTERNAL MEDICINE
Payer: COMMERCIAL

## 2023-04-25 VITALS
HEIGHT: 70 IN | WEIGHT: 227 LBS | HEART RATE: 64 BPM | SYSTOLIC BLOOD PRESSURE: 129 MMHG | BODY MASS INDEX: 32.5 KG/M2 | DIASTOLIC BLOOD PRESSURE: 81 MMHG

## 2023-04-25 VITALS — BODY MASS INDEX: 34.36 KG/M2 | WEIGHT: 240 LBS | HEIGHT: 70 IN

## 2023-04-25 DIAGNOSIS — Z00.00 ROUTINE GENERAL MEDICAL EXAMINATION AT A HEALTH CARE FACILITY: ICD-10-CM

## 2023-04-25 DIAGNOSIS — Z00.00 ROUTINE GENERAL MEDICAL EXAMINATION AT A HEALTH CARE FACILITY: Primary | ICD-10-CM

## 2023-04-25 DIAGNOSIS — Z00.00 ANNUAL PHYSICAL EXAM: Primary | ICD-10-CM

## 2023-04-25 LAB
ALBUMIN SERPL BCP-MCNC: 4.1 G/DL (ref 3.5–5.2)
ALP SERPL-CCNC: 85 U/L (ref 55–135)
ALT SERPL W/O P-5'-P-CCNC: 37 U/L (ref 10–44)
ANION GAP SERPL CALC-SCNC: 7 MMOL/L (ref 8–16)
AST SERPL-CCNC: 23 U/L (ref 10–40)
BILIRUB SERPL-MCNC: 1 MG/DL (ref 0.1–1)
BUN SERPL-MCNC: 14 MG/DL (ref 6–20)
CALCIUM SERPL-MCNC: 9.6 MG/DL (ref 8.7–10.5)
CHLORIDE SERPL-SCNC: 108 MMOL/L (ref 95–110)
CHOLEST SERPL-MCNC: 186 MG/DL (ref 120–199)
CHOLEST/HDLC SERPL: 4.5 {RATIO} (ref 2–5)
CO2 SERPL-SCNC: 27 MMOL/L (ref 23–29)
COMPLEXED PSA SERPL-MCNC: 0.47 NG/ML (ref 0–4)
CREAT SERPL-MCNC: 1.1 MG/DL (ref 0.5–1.4)
CV STRESS BASE HR: 66 BPM
DIASTOLIC BLOOD PRESSURE: 74 MMHG
ERYTHROCYTE [DISTWIDTH] IN BLOOD BY AUTOMATED COUNT: 12.5 % (ref 11.5–14.5)
EST. GFR  (NO RACE VARIABLE): >60 ML/MIN/1.73 M^2
ESTIMATED AVG GLUCOSE: 97 MG/DL (ref 68–131)
GLUCOSE SERPL-MCNC: 98 MG/DL (ref 70–110)
HBA1C MFR BLD: 5 % (ref 4–5.6)
HCT VFR BLD AUTO: 42.9 % (ref 40–54)
HDLC SERPL-MCNC: 41 MG/DL (ref 40–75)
HDLC SERPL: 22 % (ref 20–50)
HGB BLD-MCNC: 15.2 G/DL (ref 14–18)
LDLC SERPL CALC-MCNC: 131.4 MG/DL (ref 63–159)
MCH RBC QN AUTO: 31.9 PG (ref 27–31)
MCHC RBC AUTO-ENTMCNC: 35.4 G/DL (ref 32–36)
MCV RBC AUTO: 90 FL (ref 82–98)
NONHDLC SERPL-MCNC: 145 MG/DL
OHS CV CPX 1 MINUTE RECOVERY HEART RATE: 133 BPM
OHS CV CPX 85 PERCENT MAX PREDICTED HEART RATE MALE: 138
OHS CV CPX ESTIMATED METS: 15
OHS CV CPX MAX PREDICTED HEART RATE: 162
OHS CV CPX PATIENT IS FEMALE: 0
OHS CV CPX PATIENT IS MALE: 1
OHS CV CPX PEAK DIASTOLIC BLOOD PRESSURE: 86 MMHG
OHS CV CPX PEAK HEAR RATE: 153 BPM
OHS CV CPX PEAK RATE PRESSURE PRODUCT: NORMAL
OHS CV CPX PEAK SYSTOLIC BLOOD PRESSURE: 170 MMHG
OHS CV CPX PERCENT MAX PREDICTED HEART RATE ACHIEVED: 94
OHS CV CPX RATE PRESSURE PRODUCT PRESENTING: 7590
PLATELET # BLD AUTO: 202 K/UL (ref 150–450)
PMV BLD AUTO: 10.3 FL (ref 9.2–12.9)
POTASSIUM SERPL-SCNC: 4.1 MMOL/L (ref 3.5–5.1)
PROT SERPL-MCNC: 7.3 G/DL (ref 6–8.4)
RBC # BLD AUTO: 4.76 M/UL (ref 4.6–6.2)
SODIUM SERPL-SCNC: 142 MMOL/L (ref 136–145)
STRESS ECHO POST EXERCISE DUR MIN: 9 MINUTES
STRESS ECHO POST EXERCISE DUR SEC: 5 SECONDS
SYSTOLIC BLOOD PRESSURE: 115 MMHG
TRIGL SERPL-MCNC: 68 MG/DL (ref 30–150)
TSH SERPL DL<=0.005 MIU/L-ACNC: 2.29 UIU/ML (ref 0.4–4)
WBC # BLD AUTO: 5.02 K/UL (ref 3.9–12.7)

## 2023-04-25 PROCEDURE — 99999 PR PBB SHADOW E&M-EST. PATIENT-LVL III: ICD-10-PCS | Mod: PBBFAC,,, | Performed by: INTERNAL MEDICINE

## 2023-04-25 PROCEDURE — 97750 PHYSICAL PERFORMANCE TEST: CPT | Mod: S$GLB,,, | Performed by: INTERNAL MEDICINE

## 2023-04-25 PROCEDURE — 85027 COMPLETE CBC AUTOMATED: CPT | Performed by: INTERNAL MEDICINE

## 2023-04-25 PROCEDURE — 99999 PR PBB SHADOW E&M-EST. PATIENT-LVL I: ICD-10-PCS | Mod: PBBFAC,,,

## 2023-04-25 PROCEDURE — 99999 PR PBB SHADOW E&M-EST. PATIENT-LVL I: CPT | Mod: PBBFAC,,,

## 2023-04-25 PROCEDURE — 97802 MEDICAL NUTRITION INDIV IN: CPT | Mod: S$GLB,,, | Performed by: DIETITIAN, REGISTERED

## 2023-04-25 PROCEDURE — 99199 UNLISTED SPECIAL SVC PX/RPRT: CPT | Mod: S$GLB,,, | Performed by: INTERNAL MEDICINE

## 2023-04-25 PROCEDURE — 93016 EXERCISE STRESS - EKG (CUPID ONLY): ICD-10-PCS | Mod: ,,, | Performed by: INTERNAL MEDICINE

## 2023-04-25 PROCEDURE — 84153 ASSAY OF PSA TOTAL: CPT | Performed by: INTERNAL MEDICINE

## 2023-04-25 PROCEDURE — 71046 X-RAY EXAM CHEST 2 VIEWS: CPT | Mod: TC,FY

## 2023-04-25 PROCEDURE — 99999 PR PBB SHADOW E&M-EST. PATIENT-LVL III: CPT | Mod: PBBFAC,,, | Performed by: INTERNAL MEDICINE

## 2023-04-25 PROCEDURE — 97750 PR PHYSICAL PERFORMANCE TEST: ICD-10-PCS | Mod: S$GLB,,, | Performed by: INTERNAL MEDICINE

## 2023-04-25 PROCEDURE — 80053 COMPREHEN METABOLIC PANEL: CPT | Performed by: INTERNAL MEDICINE

## 2023-04-25 PROCEDURE — 1159F PR MEDICATION LIST DOCUMENTED IN MEDICAL RECORD: ICD-10-PCS | Mod: CPTII,S$GLB,, | Performed by: INTERNAL MEDICINE

## 2023-04-25 PROCEDURE — 93018 CV STRESS TEST I&R ONLY: CPT | Mod: ,,, | Performed by: INTERNAL MEDICINE

## 2023-04-25 PROCEDURE — 84443 ASSAY THYROID STIM HORMONE: CPT | Performed by: INTERNAL MEDICINE

## 2023-04-25 PROCEDURE — 83036 HEMOGLOBIN GLYCOSYLATED A1C: CPT | Performed by: INTERNAL MEDICINE

## 2023-04-25 PROCEDURE — 99199 PR SPECIAL SERVICE/PROC/REPORT: ICD-10-PCS | Mod: S$GLB,,, | Performed by: INTERNAL MEDICINE

## 2023-04-25 PROCEDURE — 80061 LIPID PANEL: CPT | Performed by: INTERNAL MEDICINE

## 2023-04-25 PROCEDURE — 97802 PR MED NUTR THER, 1ST, INDIV, EA 15 MIN: ICD-10-PCS | Mod: S$GLB,,, | Performed by: DIETITIAN, REGISTERED

## 2023-04-25 PROCEDURE — 99386 PR PREVENTIVE VISIT,NEW,40-64: ICD-10-PCS | Mod: S$GLB,,, | Performed by: INTERNAL MEDICINE

## 2023-04-25 PROCEDURE — 3074F PR MOST RECENT SYSTOLIC BLOOD PRESSURE < 130 MM HG: ICD-10-PCS | Mod: CPTII,S$GLB,, | Performed by: INTERNAL MEDICINE

## 2023-04-25 PROCEDURE — 3008F PR BODY MASS INDEX (BMI) DOCUMENTED: ICD-10-PCS | Mod: CPTII,S$GLB,, | Performed by: INTERNAL MEDICINE

## 2023-04-25 PROCEDURE — 71046 XR CHEST PA AND LATERAL: ICD-10-PCS | Mod: 26,,, | Performed by: RADIOLOGY

## 2023-04-25 PROCEDURE — 3008F BODY MASS INDEX DOCD: CPT | Mod: CPTII,S$GLB,, | Performed by: INTERNAL MEDICINE

## 2023-04-25 PROCEDURE — 93018 EXERCISE STRESS - EKG (CUPID ONLY): ICD-10-PCS | Mod: ,,, | Performed by: INTERNAL MEDICINE

## 2023-04-25 PROCEDURE — 3044F HG A1C LEVEL LT 7.0%: CPT | Mod: CPTII,S$GLB,, | Performed by: INTERNAL MEDICINE

## 2023-04-25 PROCEDURE — 3079F DIAST BP 80-89 MM HG: CPT | Mod: CPTII,S$GLB,, | Performed by: INTERNAL MEDICINE

## 2023-04-25 PROCEDURE — 1159F MED LIST DOCD IN RCRD: CPT | Mod: CPTII,S$GLB,, | Performed by: INTERNAL MEDICINE

## 2023-04-25 PROCEDURE — 3074F SYST BP LT 130 MM HG: CPT | Mod: CPTII,S$GLB,, | Performed by: INTERNAL MEDICINE

## 2023-04-25 PROCEDURE — 99386 PREV VISIT NEW AGE 40-64: CPT | Mod: S$GLB,,, | Performed by: INTERNAL MEDICINE

## 2023-04-25 PROCEDURE — 93016 CV STRESS TEST SUPVJ ONLY: CPT | Mod: ,,, | Performed by: INTERNAL MEDICINE

## 2023-04-25 PROCEDURE — 71046 X-RAY EXAM CHEST 2 VIEWS: CPT | Mod: 26,,, | Performed by: RADIOLOGY

## 2023-04-25 PROCEDURE — 3044F PR MOST RECENT HEMOGLOBIN A1C LEVEL <7.0%: ICD-10-PCS | Mod: CPTII,S$GLB,, | Performed by: INTERNAL MEDICINE

## 2023-04-25 PROCEDURE — 93017 CV STRESS TEST TRACING ONLY: CPT

## 2023-04-25 PROCEDURE — 3079F PR MOST RECENT DIASTOLIC BLOOD PRESSURE 80-89 MM HG: ICD-10-PCS | Mod: CPTII,S$GLB,, | Performed by: INTERNAL MEDICINE

## 2023-04-25 NOTE — PROGRESS NOTES
Subjective     Patient ID: Michael Lilly is a 57 y.o. male.    Chief Complaint: Annual Exam    HPI  58 yo male who works for [employer], recently purchased by a private equity group. The patient has no medical complaints and takes no medications. 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    HENT: Negative.     Eyes: Negative.    Respiratory: Negative.     Cardiovascular: Negative.    Gastrointestinal: Negative.    Genitourinary: Negative.    Musculoskeletal: Negative.    Integumentary:  Negative.   Neurological: Negative.    Psychiatric/Behavioral: Negative.     All other systems reviewed and are negative.       Objective     Physical Exam  Constitutional:       Appearance: He is well-developed.   HENT:      Head: Normocephalic and atraumatic.      Right Ear: External ear normal.      Left Ear: External ear normal.   Eyes:      Conjunctiva/sclera: Conjunctivae normal.      Pupils: Pupils are equal, round, and reactive to light.   Cardiovascular:      Rate and Rhythm: Normal rate and regular rhythm.      Heart sounds: Normal heart sounds.      Comments: Negative Stress Test and achieved 15 METS of work.  Pulmonary:      Effort: Pulmonary effort is normal.      Breath sounds: Normal breath sounds.      Comments: Peak flow 600 l/min  Abdominal:      General: Bowel sounds are normal.      Palpations: Abdomen is soft.   Musculoskeletal:         General: Normal range of motion.      Cervical back: Normal range of motion and neck supple.   Skin:     General: Skin is warm and dry.   Neurological:      Mental Status: He is alert and oriented to person, place, and time.      Deep Tendon Reflexes: Reflexes are normal and symmetric.   Psychiatric:         Behavior: Behavior normal.         Thought Content: Thought content normal.         Judgment: Judgment normal.          Assessment and Plan     Problem List Items Addressed This Visit     None  Visit Diagnoses       Annual physical exam    -  Primary            Labs::  All parameters are normal and virtually unchanged from Sept. 2021, He has lost 13 pounds and imporved his lipids and glucose.    Chest x-ray: Normal    Stress EKG  Negative for ischemia and did 15 METS of work    IMP: Healthy Male with good health habits.

## 2023-04-25 NOTE — PROGRESS NOTES
Pt. Has no significant cardiovascular or pulmonary history.    Physical Limitations:  Patient denies any limitations to physical activity     Current exercise routine:  Patient currently walks/jogs for 45-60 minutes, 2-3 days a week.  Patient practices full body resistance training, 4 days a week.  Patient stretches an average of 4 days a week.     Goals:  Patient set an initial goal of 220 lbs and a long term goal of 208 lbs to get under 30 BMI  Fun Facts:  Patient was friendly and engaged.  Patient lives in [place] but works part of his week in [place].  He has lost 16 lbs via diet modification and seems motivated to keep losing weight.  Patient was receptive to the recommendation to keep up with his current routine.      The fitness evaluation results are as follows:  D.O.S. 4/25/2023 9/30/2021 7/15/2020 7/11/2019 7/12/2018 3/2/2017   Height (in): 70 70 70 70 70 70   Weight (lbs): 225.7 241.9 236.1 246 237 243   BMI: 32.033525 34.648093 33.787478 35.68912 34.00972 34.52497   Body Fat (%): 24.70 24.70 23.20 24.03 23.53 36   Waist (cm): 105 108 109 110 108 109   RBP (mmHg): 128/78 122/90 110/90 128/86 116/86 138/92   RHR (bpm): 60 68 64 66 50 78    Strength Dominant (Lbs): 137 140 168 397.0087 136.6667 127    Strength Non Dominant (Lbs): 120 138 138.07610 130 380 918.5030   Push-up Assessment: 44 60 61 50 46 35   Curl-up Assessment: 41 45 45 40 41 56   Flexibility Testing (cm): 36 38 37 36 39 38   REE (kcals): 2036 2154 2146 2330 2120 2400       Age/gender stratified assessment:  Resting BP: Within Normal Limits   Body Fat %: Good   Waist Circumfernece: High Risk    Strength Dominant: 90th    Strength Non Dominant: 75th   Upper Body Endurance: Excellent   Abdominal Endurance: Average   Lower body Flexibiltiy: Excellent       Recommended fitness guidelines:    -150 minutes of moderate intensity aerobic exercise per week or 75 minutes of vigorous intensity aerobic exercise per week.    -2 to 4  days per week of resistance training for each muscle group.      -Daily stretching with a hold of at least 30 seconds per muscle group.

## 2023-04-25 NOTE — PROGRESS NOTES
"Nutrition Assessment  Session Time:  30 minutes      Client name:  Michael Lilly  :  1965  Age:  57 y.o.  Gender:  male    Client states:  Very pleasant patient here for his annual Executive Health physical.  Familiar with nutrition consult as he has participated in the past.  Denies significant medical encounters this past year with the exception of intentional weight loss.  Attributes such to improved exercise consistency and reduced PO intake.  Wishes to lose additional weight.  Maintains an active lifestyle, participating in weight training activities 4x weekly in addition to cardio 2x weekly.  Typically consumes two meals daily, skipping breakfast.  Shares that family members are allergic to red meat and so, limits intake of such.  Selects mostly chicken, turkey, etc. as protein sources.  May dine out for lunch during work week although wife prepares a home cooked meal for dinner typically consisting of lean protein, vegetables, etc.  Drinks mostly water, unsweet tea, and energy drinks, inquiring about the potential consequences of excessive caffeine intake.  Adds that his heart rate typically runs low (~40-50's bpm).  Overall, has no specific nutrition-related questions at this time.  Desires to lose additional weight via continued P.A. and dietary modifications.      Anthropometrics  Height:  5' 10"     Weight:  225.7#  BMI:  32.4  % Body Fat:  24.7%    Clinical Signs/Symptoms  N/V/D:  None  Appetite:  good       No past medical history on file.    No past surgical history on file.    Medications    currently has no medications in their medication list.    Vitamins, Minerals, and/or Supplements:  None     Food/Medication Interactions:  Reviewed     Food Allergies or Intolerances:  NKFA     Social History    Marital status:    Employment:  [employer]    Social History     Tobacco Use    Smoking status: Never    Smokeless tobacco: Never   Substance Use Topics    Alcohol use: Yes     Comment: " social only        Lab Reports   Sodium   Date Value Ref Range Status   04/25/2023 142 136 - 145 mmol/L Final     Potassium   Date Value Ref Range Status   04/25/2023 4.1 3.5 - 5.1 mmol/L Final     Chloride   Date Value Ref Range Status   04/25/2023 108 95 - 110 mmol/L Final     CO2   Date Value Ref Range Status   04/25/2023 27 23 - 29 mmol/L Final     Glucose   Date Value Ref Range Status   04/25/2023 98 70 - 110 mg/dL Final     BUN   Date Value Ref Range Status   04/25/2023 14 6 - 20 mg/dL Final     Creatinine   Date Value Ref Range Status   04/25/2023 1.1 0.5 - 1.4 mg/dL Final     Calcium   Date Value Ref Range Status   04/25/2023 9.6 8.7 - 10.5 mg/dL Final     Total Protein   Date Value Ref Range Status   04/25/2023 7.3 6.0 - 8.4 g/dL Final     Albumin   Date Value Ref Range Status   04/25/2023 4.1 3.5 - 5.2 g/dL Final     Total Bilirubin   Date Value Ref Range Status   04/25/2023 1.0 0.1 - 1.0 mg/dL Final     Comment:     For infants and newborns, interpretation of results should be based  on gestational age, weight and in agreement with clinical  observations.    Premature Infant recommended reference ranges:  Up to 24 hours.............<8.0 mg/dL  Up to 48 hours............<12.0 mg/dL  3-5 days..................<15.0 mg/dL  6-29 days.................<15.0 mg/dL       Alkaline Phosphatase   Date Value Ref Range Status   04/25/2023 85 55 - 135 U/L Final     AST   Date Value Ref Range Status   04/25/2023 23 10 - 40 U/L Final     ALT   Date Value Ref Range Status   04/25/2023 37 10 - 44 U/L Final     Anion Gap   Date Value Ref Range Status   04/25/2023 7 (L) 8 - 16 mmol/L Final     eGFR if    Date Value Ref Range Status   09/30/2021 >60.0 >60 mL/min/1.73 m^2 Final     eGFR if non    Date Value Ref Range Status   09/30/2021 55.4 (A) >60 mL/min/1.73 m^2 Final     Comment:     Calculation used to obtain the estimated glomerular filtration  rate (eGFR) is the CKD-EPI equation.          Lab Results   Component Value Date    WBC 5.02 04/25/2023    HGB 15.2 04/25/2023    HCT 42.9 04/25/2023    MCV 90 04/25/2023     04/25/2023        Lab Results   Component Value Date    CHOL 186 04/25/2023     Lab Results   Component Value Date    HDL 41 04/25/2023     Lab Results   Component Value Date    LDLCALC 131.4 04/25/2023     Lab Results   Component Value Date    TRIG 68 04/25/2023     Lab Results   Component Value Date    CHOLHDL 22.0 04/25/2023     Lab Results   Component Value Date    HGBA1C 5.0 04/25/2023     BP Readings from Last 1 Encounters:   09/30/21 (!) 149/74       Food History  Breakfast:  Skips  Mid-morning Snack:  None  Lunch:  (Restaurant meal) [employer] salad with chicken  Mid-afternoon Snack:  None  Dinner:  Turkey enchilada + apple + blueberries  Snack:  None  *Fluid intake:  Water, unsweet tea, ~3 Celsius drinks (200 mg caffeine/serving)    Exercise History:  >30 minutes walking/jogging 2x/week + weight training 4x/week    Cultural/Spiritual/Personal Preferences:  None identified    Support System:  spouse    State of Change:  Action    Barriers to Change:  None    Diagnosis    Other: No nutrition-related diagnosis at this time.      Intervention    RMR (Method:  InBody):  2036 kcal  Activity Factor:  1.4    ERNA:  2850 - 500 = 2350 kcal    Goals:  1.  Achieve 5% (~11#) weight loss  2.  Maintain > 150 minutes of physical activity/week as tolerated  3.  Consider reducing Celsius (caffeine) intake to 24 oz/day or less   4.  Maintain adequate vegetable intake    Nutrition Education  The following education was provided to the patient:  Complimented patient on proactive role in health maintenance.  Complimented patient on dietary compliance/modifications and resulting health improvements.  Complimented patient on physical activity efforts.  Discussed weight management.  Suggested dietary modifications based on current dietary behaviors and individual food preferences.  Discussed  nutrition-related lab values and dietary and/or lifestyle factors affecting them.  Discussed caffeine intake (recommended intake, potential health consequences of excessive caffeine, sources of caffeine, etc.).  Provided ongoing support, encouragement, and guidance toward improved health efforts.    Patient verbalized understanding of nutrition education and recommendations received.    Handouts Provided  Meal Planning Guide  Eat Fit Shopping List  Eat Fit Arti  Fueling Well On-The-Go    Monitoring/Evaluation    Monitor the following:  Weight  BMI  % Body Fat  Caloric & fluid intake    Follow Up Plan:  Communication with referring healthcare provider is unnecessary at this time as patient presented as part of annual wellness exam.  However, will follow up with patient in 1-2 years.

## 2023-04-25 NOTE — LETTER
April 25, 2023    Michael Lilly  [address]  [place] [address]             [facility] [address]  [address]  [facility] [address]  Phone: [phone] Dear Michael.           Thank you for allowing me to serve you and perform your Executive Health exam on 4/25/2023. This letter will serve as a brief summary of the physical findings and laboratory/studies performed and recommendations at this time. Today's assessment is normal in all respects. [employer] agree with you.         If you have any questions or concerns, please don't hesitate to call.    Sincerely,        Ralph Landa MD